# Patient Record
Sex: MALE | Race: BLACK OR AFRICAN AMERICAN | NOT HISPANIC OR LATINO | URBAN - METROPOLITAN AREA
[De-identification: names, ages, dates, MRNs, and addresses within clinical notes are randomized per-mention and may not be internally consistent; named-entity substitution may affect disease eponyms.]

---

## 2023-04-10 LAB
ALBUMIN (G/DL) IN SER/PLAS: 4 G/DL (ref 3.4–5)
ANION GAP IN SER/PLAS: 14 MMOL/L (ref 10–20)
CALCIUM (MG/DL) IN SER/PLAS: 9.1 MG/DL (ref 8.6–10.6)
CARBON DIOXIDE, TOTAL (MMOL/L) IN SER/PLAS: 24 MMOL/L (ref 21–32)
CHLORIDE (MMOL/L) IN SER/PLAS: 107 MMOL/L (ref 98–107)
CHOLESTEROL (MG/DL) IN SER/PLAS: 102 MG/DL (ref 0–199)
CHOLESTEROL IN HDL (MG/DL) IN SER/PLAS: 34.9 MG/DL
CHOLESTEROL/HDL RATIO: 2.9
CREATININE (MG/DL) IN SER/PLAS: 1.08 MG/DL (ref 0.5–1.3)
ESTIMATED AVERAGE GLUCOSE FOR HBA1C: 123 MG/DL
GFR MALE: 82 ML/MIN/1.73M2
GLUCOSE (MG/DL) IN SER/PLAS: 80 MG/DL (ref 74–99)
HEMOGLOBIN A1C/HEMOGLOBIN TOTAL IN BLOOD: 5.9 %
LDL: 33 MG/DL (ref 0–99)
PHOSPHATE (MG/DL) IN SER/PLAS: 2.9 MG/DL (ref 2.5–4.9)
POTASSIUM (MMOL/L) IN SER/PLAS: 4.1 MMOL/L (ref 3.5–5.3)
SODIUM (MMOL/L) IN SER/PLAS: 141 MMOL/L (ref 136–145)
TRIGLYCERIDE (MG/DL) IN SER/PLAS: 169 MG/DL (ref 0–149)
UREA NITROGEN (MG/DL) IN SER/PLAS: 15 MG/DL (ref 6–23)
VLDL: 34 MG/DL (ref 0–40)

## 2023-11-28 ENCOUNTER — OFFICE VISIT (OUTPATIENT)
Dept: PRIMARY CARE | Facility: HOSPITAL | Age: 53
End: 2023-11-28
Payer: COMMERCIAL

## 2023-11-28 VITALS
SYSTOLIC BLOOD PRESSURE: 103 MMHG | BODY MASS INDEX: 31.54 KG/M2 | WEIGHT: 238 LBS | DIASTOLIC BLOOD PRESSURE: 71 MMHG | TEMPERATURE: 98.8 F | HEIGHT: 73 IN | HEART RATE: 59 BPM | OXYGEN SATURATION: 97 %

## 2023-11-28 DIAGNOSIS — Z00.00 HEALTHCARE MAINTENANCE: ICD-10-CM

## 2023-11-28 DIAGNOSIS — I26.99 PULMONARY EMBOLISM, UNSPECIFIED CHRONICITY, UNSPECIFIED PULMONARY EMBOLISM TYPE, UNSPECIFIED WHETHER ACUTE COR PULMONALE PRESENT (MULTI): ICD-10-CM

## 2023-11-28 DIAGNOSIS — E78.00 HIGH CHOLESTEROL: Primary | ICD-10-CM

## 2023-11-28 DIAGNOSIS — Z00.00 HEALTH MAINTENANCE EXAMINATION: ICD-10-CM

## 2023-11-28 DIAGNOSIS — I10 PRIMARY HYPERTENSION: ICD-10-CM

## 2023-11-28 DIAGNOSIS — I50.9 HEART FAILURE, UNSPECIFIED HF CHRONICITY, UNSPECIFIED HEART FAILURE TYPE (MULTI): ICD-10-CM

## 2023-11-28 DIAGNOSIS — E55.9 VITAMIN D DEFICIENCY: ICD-10-CM

## 2023-11-28 PROCEDURE — G0008 ADMIN INFLUENZA VIRUS VAC: HCPCS | Mod: GC | Performed by: RADIOLOGY

## 2023-11-28 PROCEDURE — 1036F TOBACCO NON-USER: CPT | Performed by: RADIOLOGY

## 2023-11-28 PROCEDURE — 99215 OFFICE O/P EST HI 40 MIN: CPT | Performed by: RADIOLOGY

## 2023-11-28 PROCEDURE — 99215 OFFICE O/P EST HI 40 MIN: CPT | Mod: GC | Performed by: RADIOLOGY

## 2023-11-28 PROCEDURE — 3078F DIAST BP <80 MM HG: CPT | Performed by: RADIOLOGY

## 2023-11-28 PROCEDURE — 3074F SYST BP LT 130 MM HG: CPT | Performed by: RADIOLOGY

## 2023-11-28 RX ORDER — CHOLECALCIFEROL (VITAMIN D3) 25 MCG
5000 TABLET ORAL DAILY
Qty: 150 TABLET | Refills: 11 | Status: SHIPPED | OUTPATIENT
Start: 2023-11-28 | End: 2024-02-12 | Stop reason: SDUPTHER

## 2023-11-28 RX ORDER — METOPROLOL SUCCINATE 50 MG/1
100 TABLET, EXTENDED RELEASE ORAL DAILY
Qty: 360 TABLET | Refills: 0 | Status: SHIPPED | OUTPATIENT
Start: 2023-11-28 | End: 2024-02-12 | Stop reason: SDUPTHER

## 2023-11-28 RX ORDER — ATORVASTATIN CALCIUM 40 MG/1
40 TABLET, FILM COATED ORAL DAILY
Qty: 30 TABLET | Refills: 5 | Status: SHIPPED | OUTPATIENT
Start: 2023-11-28 | End: 2024-02-12 | Stop reason: SDUPTHER

## 2023-11-28 RX ORDER — FUROSEMIDE 40 MG/1
40 TABLET ORAL DAILY
Qty: 30 TABLET | Refills: 11 | Status: SHIPPED | OUTPATIENT
Start: 2023-11-28 | End: 2023-11-28 | Stop reason: SDUPTHER

## 2023-11-28 RX ORDER — LISINOPRIL 10 MG/1
10 TABLET ORAL DAILY
Qty: 30 TABLET | Refills: 5 | Status: SHIPPED | OUTPATIENT
Start: 2023-11-28 | End: 2024-02-12 | Stop reason: SDUPTHER

## 2023-11-28 RX ORDER — FUROSEMIDE 40 MG/1
40 TABLET ORAL DAILY PRN
Qty: 30 TABLET | Refills: 11 | Status: SHIPPED | OUTPATIENT
Start: 2023-11-28 | End: 2024-02-12 | Stop reason: SDUPTHER

## 2023-11-28 ASSESSMENT — ENCOUNTER SYMPTOMS
DEPRESSION: 0
LOSS OF SENSATION IN FEET: 0
OCCASIONAL FEELINGS OF UNSTEADINESS: 0

## 2023-11-28 ASSESSMENT — PATIENT HEALTH QUESTIONNAIRE - PHQ9
2. FEELING DOWN, DEPRESSED OR HOPELESS: NOT AT ALL
SUM OF ALL RESPONSES TO PHQ9 QUESTIONS 1 AND 2: 0
1. LITTLE INTEREST OR PLEASURE IN DOING THINGS: NOT AT ALL

## 2023-11-28 ASSESSMENT — PAIN SCALES - GENERAL: PAINLEVEL: 0-NO PAIN

## 2023-11-28 NOTE — PATIENT INSTRUCTIONS
--> NO changes were made to your medications today---    --> Prescriptions/refills were sent to your pharmacy.       --> Please call the  appointment line:  [phone] --- to schedule appointments for ---    Cardiology    Bring cologuard results at next visit    --> Please schedule followup appointment with Sanya Benitez [facility] [phone] in 6 months.    [facility]: phone: [phone], fax: [phone]  [facility] appointment line: [phone] or [phone]

## 2023-11-28 NOTE — PROGRESS NOTES
[facility]     __________________________  ASSESSMENT/PLAN:  __________________________    53 year old male with a PMHx of HTN, pre-diabetes, CKD (baseline Cr 1.0-1.4), cardiac thrombus (2008), HFpEF (recovered HFrEF 35-40%, 50-55% in 2017), BLE DVT & PE (2017), Right knee OA s/p TKA c/b Rt feoral fracture s/p ORIF (July 2022, [facility]) who presents to the [facility].     #HFpEF (previous HFrEF but recovered in 2017)  #Dana-operative VT dana-operational (July 2022)  #HTN  #HLD  -EF improved on TTE from 11/29/17 to 50-55%  -Continue lisinopril 10mg daily, metoprolol succinate 100mg daily and furosemide 40mg PRN  -Continue atorvastatin 40mg daily  -Cardiology referral made    #Right knee OA s/p TKA c/b Rt femoral fracture s/p ORIF (July 2022, [facility])  -[facility] ortho following, plan for potential L TKA in spring 2024    #DVT/PE (4/2017)  #History of cardiac thrombus (2008)  -on rivaroxaban 20mg daily     #CKD:  -Baseline CR 1.0-1.4     #Prediabetes (A1c of 5.9 4/2024  -off meformin     #Health maintenance  -colonoscopy: ordered, but pt said he did cologuard few wks ago through insurance, asked to bring results next time  -HIV (2017): negative  -Hep B/C (2017): negative  -TSH (2018): 0.99  -Lipid panel (2024)  -Pneumovax: 2018, next after 64  -Shingles: 12/2019 and 2/2020  -COVID x3  -flu 2024    RETURN TO CLINIC:  6 MONTHS    __________________________  HPI:  __________________________    53 year old male with a PMHx of HTN, pre-diabetes, CKD (baseline Cr 1.0-1.4), cardiac thrombus (2008), HFpEF (recovered HFrEF 35-40%, 50-55% in 2017), BLE DVT & PE (2017), Right knee OA s/p TKA c/b Rt feoral fracture s/p ORIF (July 2022, [facility]) who [facility].    Feeling well, no issues, here for checkup/refills    Recently had R knee/femur surgery done by ortho [facility], has been in rehab    ROS:   Denied fever/chills, headache, vision changes, lightheadedness/dizziness, chest pain, SOB, cough,  "abdominal pain, n/v, constipation/diarrhea, dysuria, weakness.       MEDS:    Current Outpatient Medications:     atorvastatin (Lipitor) 40 mg tablet, Take 1 tablet (40 mg) by mouth once daily., Disp: 30 tablet, Rfl: 5    cholecalciferol (Vitamin D-3) 25 MCG (1000 UT) tablet, Take 5 tablets (5,000 Units) by mouth once daily., Disp: 150 tablet, Rfl: 11    furosemide (Lasix) 40 mg tablet, Take 1 tablet (40 mg) by mouth once daily as needed (take if weight gain greater than 3 lbs in 3 days)., Disp: 30 tablet, Rfl: 11    lisinopril 10 mg tablet, Take 1 tablet (10 mg) by mouth once daily., Disp: 30 tablet, Rfl: 5    metoprolol succinate XL (Toprol-XL) 50 mg 24 hr tablet, Take 2 tablets (100 mg) by mouth once daily. Do not crush or chew., Disp: 360 tablet, Rfl: 0    rivaroxaban (Xarelto) 20 mg tablet, Take 1 tablet (20 mg) by mouth once daily. Take with food., Disp: 30 tablet, Rfl: 11     __________________________  OBJECTIVE:  __________________________    /71 (BP Location: Right arm, Patient Position: Sitting, BP Cuff Size: Large adult)   Pulse 59   Temp 37.1 °C (98.8 °F) (Temporal)   Ht 1.854 m (6' 1\")   Wt 108 kg (238 lb)   SpO2 97%   BMI 31.40 kg/m²      Constitutional - In no acute distress, well appearing and well nourished.   Head- Normocephalic, atraumatic.   Moist mucous membrane.    Eye: EOMI. No scleral icterus.  No conjunctival injection.  No drainage noted.  Neck - Trachea midline. No neck masses observed.   Pulmonary - Normal respiration. Clear to auscultation bilaterally.   Cardiovascular - Normal rate and rhythm, no murmurs/gallop/rubs.   Ext - trace LE peripheral edema. B/l venous stasis  Abdomen- No TTP,  no guarding, no rebound, bowel sounds positive in all 4 quadrants.    Neuro - Alert. Answering questions appropriately. No gross neurological deficits. Spontaneously moving all extremities.  Psychiatric -  Normal mood and affect.      Varinder Villela MD  PGY-3 Internal Medicine  [facility] [facility]

## 2023-11-28 NOTE — PROGRESS NOTES
I saw and evaluated the patient. I personally obtained the key and critical portions of the history and physical exam or was physically present for key and critical portions performed by the resident/fellow. I reviewed the resident/fellow's documentation and discussed the patient with the resident/fellow. I agree with the resident/fellow's medical decision making as documented in the note.    Aviva Walker MD

## 2023-11-30 ENCOUNTER — APPOINTMENT (OUTPATIENT)
Dept: DERMATOLOGY | Facility: CLINIC | Age: 53
End: 2023-11-30
Payer: COMMERCIAL

## 2024-02-12 ENCOUNTER — TELEPHONE (OUTPATIENT)
Dept: PRIMARY CARE | Facility: HOSPITAL | Age: 54
End: 2024-02-12
Payer: COMMERCIAL

## 2024-02-12 DIAGNOSIS — I50.9 HEART FAILURE, UNSPECIFIED HF CHRONICITY, UNSPECIFIED HEART FAILURE TYPE (MULTI): ICD-10-CM

## 2024-02-12 DIAGNOSIS — I10 PRIMARY HYPERTENSION: ICD-10-CM

## 2024-02-12 DIAGNOSIS — I26.99 PULMONARY EMBOLISM, UNSPECIFIED CHRONICITY, UNSPECIFIED PULMONARY EMBOLISM TYPE, UNSPECIFIED WHETHER ACUTE COR PULMONALE PRESENT (MULTI): ICD-10-CM

## 2024-02-12 DIAGNOSIS — E55.9 VITAMIN D DEFICIENCY: ICD-10-CM

## 2024-02-12 DIAGNOSIS — E78.00 HIGH CHOLESTEROL: ICD-10-CM

## 2024-02-12 RX ORDER — ATORVASTATIN CALCIUM 40 MG/1
40 TABLET, FILM COATED ORAL DAILY
Qty: 30 TABLET | Refills: 5 | Status: SHIPPED | OUTPATIENT
Start: 2024-02-12 | End: 2024-08-10

## 2024-02-12 RX ORDER — LISINOPRIL 10 MG/1
10 TABLET ORAL DAILY
Qty: 30 TABLET | Refills: 5 | Status: SHIPPED | OUTPATIENT
Start: 2024-02-12 | End: 2024-08-10

## 2024-02-12 RX ORDER — FUROSEMIDE 40 MG/1
40 TABLET ORAL DAILY PRN
Qty: 30 TABLET | Refills: 11 | Status: SHIPPED | OUTPATIENT
Start: 2024-02-12 | End: 2025-02-11

## 2024-02-12 RX ORDER — METOPROLOL SUCCINATE 50 MG/1
100 TABLET, EXTENDED RELEASE ORAL DAILY
Qty: 360 TABLET | Refills: 0 | Status: SHIPPED | OUTPATIENT
Start: 2024-02-12 | End: 2024-08-10

## 2024-02-12 RX ORDER — CHOLECALCIFEROL (VITAMIN D3) 25 MCG
5000 TABLET ORAL DAILY
Qty: 150 TABLET | Refills: 11 | Status: SHIPPED | OUTPATIENT
Start: 2024-02-12 | End: 2024-02-12 | Stop reason: SDUPTHER

## 2024-02-12 NOTE — TELEPHONE ENCOUNTER
Rx Refill Request Telephone Encounter    Name:  Jac Pederson  :  [id number]    Prescription sent to [facility] pharmacy for atorvastatin, Lasix, lisinopril, metoprolol succinate, rivaroxaban.  Patient called and informed of the above.  Reports that he does not need vitamin D refilled as he is already taking over-the-counter.

## 2024-02-14 ENCOUNTER — OFFICE VISIT (OUTPATIENT)
Dept: DERMATOLOGY | Facility: CLINIC | Age: 54
End: 2024-02-14
Payer: COMMERCIAL

## 2024-02-14 ENCOUNTER — APPOINTMENT (OUTPATIENT)
Dept: CARDIOLOGY | Facility: HOSPITAL | Age: 54
End: 2024-02-14
Payer: COMMERCIAL

## 2024-02-14 DIAGNOSIS — I87.8 VENOUS STASIS OF BOTH LOWER EXTREMITIES: Primary | ICD-10-CM

## 2024-02-14 PROCEDURE — 99203 OFFICE O/P NEW LOW 30 MIN: CPT | Performed by: STUDENT IN AN ORGANIZED HEALTH CARE EDUCATION/TRAINING PROGRAM

## 2024-02-14 PROCEDURE — 1036F TOBACCO NON-USER: CPT | Performed by: STUDENT IN AN ORGANIZED HEALTH CARE EDUCATION/TRAINING PROGRAM

## 2024-02-14 NOTE — PROGRESS NOTES
Subjective     Jac Pederson is a 53 y.o. male who presents for the following: hyperpigmentation (Bilateral legs).     Review of Systems:  No other skin or systemic complaints other than what is documented elsewhere in the note.    The following portions of the chart were reviewed this encounter and updated as appropriate:          Skin Cancer History  No skin cancer on file.      Specialty Problems    None       Objective   Well appearing patient in no apparent distress; mood and affect are within normal limits.    A focused skin examination was performed. All findings within normal limits unless otherwise noted below.    Assessment/Plan   1. Venous stasis of both lower extremities (2)  Left Lower Leg - Anterior, Right Lower Leg - Anterior  Hyperpigmented macules and patches involving bilateral lower extremities and areas of atrophic hypopigmentation most consistent with scars. 2+ pitting edema. On the left lateral ankle is a roughly 2 cm ulcer with granulation tissue    - Venous stasis changes of bilateral lower extremities, no active stasis dermatitis  - Recommend compression stockings daily, patient has these at home  - Patient reports he scratched himself where the wound is on the left ankle 1 week ago but advised to return if not healed in the next month or so        Angelique Johnson MD    I was present during all portions of visit and supervised resident directly   Including any discussion and performing of procedures as well as medical management and follow up care

## 2024-02-19 ENCOUNTER — APPOINTMENT (OUTPATIENT)
Dept: CARDIOLOGY | Facility: HOSPITAL | Age: 54
End: 2024-02-19
Payer: COMMERCIAL

## 2024-06-24 DIAGNOSIS — I50.9 HEART FAILURE, UNSPECIFIED HF CHRONICITY, UNSPECIFIED HEART FAILURE TYPE (MULTI): ICD-10-CM

## 2024-06-26 RX ORDER — METOPROLOL SUCCINATE 50 MG/1
TABLET, EXTENDED RELEASE ORAL
Qty: 360 TABLET | Refills: 2 | Status: SHIPPED | OUTPATIENT
Start: 2024-06-26

## 2024-09-25 ENCOUNTER — TELEPHONE (OUTPATIENT)
Dept: HOME HEALTH SERVICES | Facility: HOME HEALTH | Age: 54
End: 2024-09-25

## 2024-09-25 ENCOUNTER — DOCUMENTATION (OUTPATIENT)
Dept: PRIMARY CARE | Facility: HOSPITAL | Age: 54
End: 2024-09-25
Payer: COMMERCIAL

## 2024-09-25 ENCOUNTER — OFFICE VISIT (OUTPATIENT)
Dept: PRIMARY CARE | Facility: HOSPITAL | Age: 54
End: 2024-09-25
Payer: COMMERCIAL

## 2024-09-25 VITALS
BODY MASS INDEX: 34.99 KG/M2 | HEIGHT: 73 IN | DIASTOLIC BLOOD PRESSURE: 80 MMHG | WEIGHT: 264 LBS | HEART RATE: 72 BPM | OXYGEN SATURATION: 97 % | TEMPERATURE: 97.6 F | SYSTOLIC BLOOD PRESSURE: 131 MMHG

## 2024-09-25 DIAGNOSIS — I10 PRIMARY HYPERTENSION: ICD-10-CM

## 2024-09-25 DIAGNOSIS — Z12.11 COLON CANCER SCREENING: ICD-10-CM

## 2024-09-25 DIAGNOSIS — I26.99 PULMONARY EMBOLISM, UNSPECIFIED CHRONICITY, UNSPECIFIED PULMONARY EMBOLISM TYPE, UNSPECIFIED WHETHER ACUTE COR PULMONALE PRESENT (MULTI): ICD-10-CM

## 2024-09-25 DIAGNOSIS — T14.8XXD WOUND HEALING, DELAYED: Primary | ICD-10-CM

## 2024-09-25 DIAGNOSIS — E78.00 HIGH CHOLESTEROL: ICD-10-CM

## 2024-09-25 DIAGNOSIS — I50.9 HEART FAILURE, UNSPECIFIED HF CHRONICITY, UNSPECIFIED HEART FAILURE TYPE: ICD-10-CM

## 2024-09-25 LAB
ALBUMIN SERPL BCP-MCNC: 4.1 G/DL (ref 3.4–5)
ALP SERPL-CCNC: 112 U/L (ref 33–120)
ALT SERPL W P-5'-P-CCNC: 17 U/L (ref 10–52)
ANION GAP SERPL CALC-SCNC: 14 MMOL/L (ref 10–20)
AST SERPL W P-5'-P-CCNC: 19 U/L (ref 9–39)
BILIRUB SERPL-MCNC: 0.7 MG/DL (ref 0–1.2)
BUN SERPL-MCNC: 16 MG/DL (ref 6–23)
CALCIUM SERPL-MCNC: 9.7 MG/DL (ref 8.6–10.6)
CHLORIDE SERPL-SCNC: 102 MMOL/L (ref 98–107)
CHOLEST SERPL-MCNC: 128 MG/DL (ref 0–199)
CHOLESTEROL/HDL RATIO: 2.9
CO2 SERPL-SCNC: 25 MMOL/L (ref 21–32)
CREAT SERPL-MCNC: 1.12 MG/DL (ref 0.5–1.3)
EGFRCR SERPLBLD CKD-EPI 2021: 78 ML/MIN/1.73M*2
ERYTHROCYTE [DISTWIDTH] IN BLOOD BY AUTOMATED COUNT: 14.6 % (ref 11.5–14.5)
GLUCOSE SERPL-MCNC: 89 MG/DL (ref 74–99)
HCT VFR BLD AUTO: 45.9 % (ref 41–52)
HDLC SERPL-MCNC: 44.4 MG/DL
HGB BLD-MCNC: 15.6 G/DL (ref 13.5–17.5)
LDLC SERPL CALC-MCNC: 66 MG/DL
MAGNESIUM SERPL-MCNC: 1.92 MG/DL (ref 1.6–2.4)
MCH RBC QN AUTO: 31.4 PG (ref 26–34)
MCHC RBC AUTO-ENTMCNC: 34 G/DL (ref 32–36)
MCV RBC AUTO: 92 FL (ref 80–100)
NON HDL CHOLESTEROL: 84 MG/DL (ref 0–149)
NRBC BLD-RTO: 0 /100 WBCS (ref 0–0)
PHOSPHATE SERPL-MCNC: 3.2 MG/DL (ref 2.5–4.9)
PLATELET # BLD AUTO: 311 X10*3/UL (ref 150–450)
POTASSIUM SERPL-SCNC: 4 MMOL/L (ref 3.5–5.3)
PROT SERPL-MCNC: 7.8 G/DL (ref 6.4–8.2)
RBC # BLD AUTO: 4.97 X10*6/UL (ref 4.5–5.9)
SODIUM SERPL-SCNC: 137 MMOL/L (ref 136–145)
TRIGL SERPL-MCNC: 86 MG/DL (ref 0–149)
VLDL: 17 MG/DL (ref 0–40)
WBC # BLD AUTO: 6.7 X10*3/UL (ref 4.4–11.3)

## 2024-09-25 PROCEDURE — 99214 OFFICE O/P EST MOD 30 MIN: CPT

## 2024-09-25 PROCEDURE — 3008F BODY MASS INDEX DOCD: CPT

## 2024-09-25 PROCEDURE — 80061 LIPID PANEL: CPT

## 2024-09-25 PROCEDURE — 99214 OFFICE O/P EST MOD 30 MIN: CPT | Mod: GC

## 2024-09-25 PROCEDURE — 36415 COLL VENOUS BLD VENIPUNCTURE: CPT

## 2024-09-25 PROCEDURE — 3075F SYST BP GE 130 - 139MM HG: CPT

## 2024-09-25 PROCEDURE — 84100 ASSAY OF PHOSPHORUS: CPT

## 2024-09-25 PROCEDURE — 93010 ELECTROCARDIOGRAM REPORT: CPT | Performed by: INTERNAL MEDICINE

## 2024-09-25 PROCEDURE — 93005 ELECTROCARDIOGRAM TRACING: CPT

## 2024-09-25 PROCEDURE — 90677 PCV20 VACCINE IM: CPT | Mod: GC

## 2024-09-25 PROCEDURE — G0008 ADMIN INFLUENZA VIRUS VAC: HCPCS | Mod: GC

## 2024-09-25 PROCEDURE — 1036F TOBACCO NON-USER: CPT

## 2024-09-25 PROCEDURE — 85027 COMPLETE CBC AUTOMATED: CPT

## 2024-09-25 PROCEDURE — 80053 COMPREHEN METABOLIC PANEL: CPT

## 2024-09-25 PROCEDURE — 83735 ASSAY OF MAGNESIUM: CPT

## 2024-09-25 PROCEDURE — 3079F DIAST BP 80-89 MM HG: CPT

## 2024-09-25 RX ORDER — LISINOPRIL 10 MG/1
10 TABLET ORAL DAILY
Qty: 30 TABLET | Refills: 5 | Status: SHIPPED | OUTPATIENT
Start: 2024-09-25 | End: 2025-03-24

## 2024-09-25 RX ORDER — FUROSEMIDE 40 MG/1
40 TABLET ORAL DAILY PRN
Qty: 90 EACH | Refills: 2 | Status: SHIPPED | OUTPATIENT
Start: 2024-09-25 | End: 2025-09-25

## 2024-09-25 RX ORDER — METOPROLOL SUCCINATE 50 MG/1
50 TABLET, EXTENDED RELEASE ORAL DAILY
Qty: 90 TABLET | Refills: 2 | Status: SHIPPED | OUTPATIENT
Start: 2024-09-25 | End: 2025-06-22

## 2024-09-25 RX ORDER — ATORVASTATIN CALCIUM 40 MG/1
40 TABLET, FILM COATED ORAL DAILY
Qty: 30 TABLET | Refills: 5 | Status: SHIPPED | OUTPATIENT
Start: 2024-09-25 | End: 2025-03-24

## 2024-09-25 ASSESSMENT — PATIENT HEALTH QUESTIONNAIRE - PHQ9
SUM OF ALL RESPONSES TO PHQ9 QUESTIONS 1 AND 2: 0
2. FEELING DOWN, DEPRESSED OR HOPELESS: NOT AT ALL
1. LITTLE INTEREST OR PLEASURE IN DOING THINGS: NOT AT ALL

## 2024-09-25 ASSESSMENT — PAIN SCALES - GENERAL: PAINLEVEL: 0-NO PAIN

## 2024-09-25 ASSESSMENT — ENCOUNTER SYMPTOMS
OCCASIONAL FEELINGS OF UNSTEADINESS: 0
DEPRESSION: 0
LOSS OF SENSATION IN FEET: 0

## 2024-09-25 NOTE — PROGRESS NOTES
Chief complaint:  No new complaints     HPI:  Jac Pederson is a 54 y.o. male w/ PMH of HFrEF (prev HFrEF, now recovered, TTE 11/2022 EF 50-55%), DVT (B/l LE 2017, on Xarelto), HTN, HLD, Pre DM (last A1C 5/7 6.0), B/l Chronic Stasis Dermatitis, Bl TKA (last was L in 5/2024, prior R c/b femur fx requiring ORIF) and healing LLE wound to lateral ankle who presents for f/up care and request for renewal for Home Care for Left lower extremity wound care. Pt reports that his health has been generally well and did not have any new issues to report. He was unable to go for his Cardiology referral from 11/2023 appointment. A new referral was made and Mr. Pederson said he would follow-up with this appointment. He was noted to have gained weight of ~30Ibs from 11/2023 PCP visit but he denied any extra pillows when sleeping and no HITCHCOCK. He stated that he did not get his Cologuard in the mail and would like to have Colonoscopy ordered and will be following with the appointment. Condirmed that his L TKA in 05/2024 was successful and no other issues there. His L still had some pain that he takes Aleve for. He was cautioned to use moderately and to use Tylenol as adjunct given being on Xarelto. He denied any other complaints of fever, chills, headache, N/V, chest pain, shortness of breath, abdominal pain, constipation/diarrhea, dysuria.     Pt was noted to have irregular heart beat on Physical Exam and an EKG showed he was likely in Afib rhythm.   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 .    Review of systems:  As stated in HPI    Medications:  Current Outpatient Medications   Medication Instructions    atorvastatin (LIPITOR) 40 mg, oral, Daily    furosemide (LASIX) 40 mg, oral, Daily PRN    lisinopril 10 mg, oral, Daily    metoprolol succinate XL (Toprol-XL) 50 mg 24 hr tablet TAKE 2 TABLETS ONCE DAILY (DO NOT CRUSH OR CHEW)    rivaroxaban (XARELTO) 20 mg, oral, Daily,  "Take with food.     Allergies:  Allergies   Allergen Reactions    Orange Hives and Itching     Past medical history:  Past Medical History:   Diagnosis Date    Personal history of other specified conditions 05/07/2018    History of insomnia    Rash and other nonspecific skin eruption 07/12/2017    Rash     Surgical history:  L and R TKA     Family history:  Father - Prostate CA    Social history:   reports that he has never smoked. He has never used smokeless tobacco. He reports that he does not drink alcohol and does not use drugs.    Health maintenance:  Health Maintenance   Topic Date Due    Medicare Annual Wellness Visit (AWV)  Never done    Colorectal Cancer Screening  Never done    MMR Vaccines (1 of 1 - Standard series) Never done    Hepatitis B Vaccines (1 of 3 - 19+ 3-dose series) Never done    Pneumococcal Vaccine: Pediatrics (0 to 5 Years) and At-Risk Patients (6 to 64 Years) (2 of 2 - PCV) 01/17/2019    Echocardiogram  07/11/2023    Creatinine Level  04/10/2024    Potassium Level  04/10/2024    Influenza Vaccine (1) 09/01/2024    COVID-19 Vaccine (5 - 2023-24 season) 09/01/2024    Diabetes: Hemoglobin A1C  05/07/2025    Diabetes Screening  05/07/2025    DTaP/Tdap/Td Vaccines (2 - Td or Tdap) 07/12/2027    Lipid Panel  04/10/2028    Zoster Vaccines  Completed    HIV Screening  Completed    Hepatitis C Screening  Completed    HIB Vaccines  Aged Out    IPV Vaccines  Aged Out    Hepatitis A Vaccines  Aged Out    Meningococcal Vaccine  Aged Out    Rotavirus Vaccines  Aged Out    HPV Vaccines  Aged Out     Vitals:  /80   Pulse 72   Temp 36.4 °C (97.6 °F) (Temporal)   Ht 1.854 m (6' 1\")   Wt 120 kg (264 lb)   SpO2 97%   BMI 34.83 kg/m²     Physical Exam:  General: Awake, alert, conversant, appears stated age, Obese  HEENT: Pupils equal and round, no scleral icterus or conjunctivitis  Skin: Healing wound to L outer ankle. B/l Stasis Dermatitis present.   Chest: Ctab, normal respiratory effort, not on " "supplemental oxygen  Cardiac: Irregular rhythm but regular rate, normal s1, s2, no M/R/G, no JVD  Abdomen: Soft, ND, NT, no involuntary guarding  : No flank pain or indwelling urinary catheter  EXT: No peripheral edema, no asymmetry noted  MSK: No focal joint swelling noted  Neuro: AOx4, moving all limbs spontaneously, follows commands  Psych: Coherent thought process, appropriate mood and affect    Labs:  Lab Results   Component Value Date    WBC 6.0 11/05/2018    HGB 14.2 11/05/2018    HCT 42.7 11/05/2018    MCV 96 11/05/2018     11/05/2018     Lab Results   Component Value Date    GLUCOSE 80 04/10/2023    CALCIUM 9.1 04/10/2023     04/10/2023    K 4.1 04/10/2023    CO2 24 04/10/2023     04/10/2023    BUN 15 04/10/2023    CREATININE 1.08 04/10/2023     Lab Results   Component Value Date    HGBA1C 6.0 (H) 05/07/2024      Lab Results   Component Value Date    CHOL 102 04/10/2023    CHOL 113 07/30/2020    CHOL 133 11/05/2018     Lab Results   Component Value Date    HDL 34.9 (A) 04/10/2023    HDL 34.0 (A) 07/30/2020    HDL 44.5 11/05/2018     No results found for: \"LDLCALC\"  Lab Results   Component Value Date    TRIG 169 (H) 04/10/2023    TRIG 174 (H) 07/30/2020    TRIG 209 (H) 11/05/2018         Assessment and plan:  Mr. Jac Pederson is a 55 y/o M w/ PMH of HF (prev HFrEF, now recovered, TTE 11/2022 EF 50-55%), DVT (B/l LE 2017, on Xarelto), HTN, HLD, Pre DM (last A1C 5/7 6.0), B/l Chronic Stasis Dermatitis, Bl TKA (last was L in 5/2024, prior R c/b femur fx requiring ORIf) and healing LLE wound to lateral ankle who presents for f/up care and request for renewal for Home Care for Left lower extremity wound care. Pt was seen to have irregular HR and EKG showed likely Afib. Pt is asymptomatic and no evidence of decompensated HF. Pt plans to follow referral to Cardiology. Pt also received Flu and Pneumovax Vaccine on this encounter.      Encounter summary:  - To continue to take your medications " as indicated   - Continue wound care as instructed and will have Home Health assist with this - referral made   - Cardiology referral made for Heart Failure diagnosis and evidence of irregular heart rhythm   - Pt reported taking Metop Succ 50mg daily instead of 100mg. Script renewed for 50mg as HR was ~72. Will follow Cards recs.   - Colonoscopy - ordered   - Lab work collected and will be followed up by the [facility] and you will be informed of the results - CBC, CMP, Mg, Phos, Lipid Panel   - Vaccinations for Influenza and Pneumococcal Vaccine     #HFpEF (previous HFrEF but recovered in 2017)  #[place]-operative VT modesto-operational (July 2022)  #HTN  #HLD  #Afib?  :: EF improved on TTE from 6/2022 to 50-55%  :: EKG 9/25 showing Afib rhythm; not symptomatic    - C/w Lisinopril 10mg daily  - C/w Metoprolol Succinate 50mg daily  - C/w Furosemide 40mg PRN for 3Ibs weight gain over 3 days   - C/w Atorvastatin 40mg daily  - Already on Xarelto for hx of DVT   - Cardiology referral made  - F/up on CMP, Mg, CBC, Lipid Panel     #Right knee OA   #LLE wound   :: S/p TKA, c/b Rt femoral fracture s/p ORIF - July 2022, [facility]  :: S/p TKA L TKA 5/2024  :: LLE wound from 4/2024 after accidental laceration trauma   - Follows w/ [facility] Ortho for Knees  - Home Health care to help w/ LLE wound dressing - referral made and paperwork faxed 9/25     #DVT/PE (4/2017)  #History of cardiac thrombus (2008)  - C/w Rivaroxaban 20mg daily     #CKD  :: Baseline CR 1.0-1.4  - F/up CMP, Mg, Phos     #Pre-Diabetes (A1c 6.0 5/2024)  :: Previously managed on Metformin  :: Currently diet controlled  - C/w w/ current diet plan     #Health maintenance  - Colonoscopy ordered   - HIV (2017): negative  - Hep B/C (2017): negative  -TSH (2018): 0.99  - Lipid panel (2024)  - Pneumovax 23 2018, PREVNAR 20 9/2024 - next after 64  - Shingles: 12/2019 and 2/2020  - COVID x3  - Flu 2024    Items for Next Visit:  Follow-up Labs, Cards, LLE wound    Patient and plan discussed  with attending physician Dr. Sampson.    Clement Seth MD  PGY2, Internal Medicine

## 2024-09-25 NOTE — PROGRESS NOTES
"Patient ID: Jac Pederson is a 54 y.o. male.    ProceduresSubjective   Patient ID: Jac Pederson is a 54 y.o. male who presents for Follow-up (F2F HOME CARE WOUND CARE, MEDICATION REFILLS).    HPI     Review of Systems    Objective   /80   Pulse 72   Temp 36.4 °C (97.6 °F) (Temporal)   Ht 1.854 m (6' 1\")   Wt 120 kg (264 lb)   SpO2 97%   BMI 34.83 kg/m²     Physical Exam    Assessment/Plan   {Assess/PlanSmartLinks:47506}       "

## 2024-09-25 NOTE — TELEPHONE ENCOUNTER
Good afternoon,  [facility] requires specific wound care instructions for the patient. I.E. what type of dressings to use; what kind of topicals, cleansers, etc.; how does the MD want home care to treat the wounds and how often. Also, please provide the name of a teachable CG or who will assistance in the home for wound care?  Also, Pt must be HOMEBOUND if services will be covered by CMS. Please amend orders and resubmit if patient is indeed homebound. If not, the patient will need to go outpatient for services. Thank you.

## 2024-09-25 NOTE — PROGRESS NOTES
Advanced care planning discussed at this visit. Patient has a Healthcare Power of  but it is currently not on file. He expressed that his sister Davin Angulo has his permission to act as his surrogate decision maker in the event of an emergency. In addition, the patient added his daughter, Donna Angulo, as his first alternate surrogate decision maker. Patient advised to bring in POA, Living Will and Advanced Care Plan for his chart at next visit.

## 2024-09-25 NOTE — PATIENT INSTRUCTIONS
As discussed today, our plan is:   - To continue to take your medications as indicated   - Continue wound care as instructed and will have Home Health assist with this - referral made   - Cardiology referral made for Heart Failure diagnosis and evidence of irregular heart rhythm   - Colonoscopy - ordered   - Lab work collected and will be followed up by the [facility] and you will be informed of the results  - Vaccinations for Influenza and Pneumococcal Vaccine     Please note the following below as well:     Labs - we collected labs today and will call you with any abnormal results. If you have any questions or concerns prior to us calling feel free to call our office to have your questions addressed.   Medication changes: None   Consultations - you were referred to see the following specialists: Cardiology - You should receive a call from central scheduling in the next few days if you do not receive a call within 3-5 business days please call [phone] to schedule your appointment.   4.   If you smoke or use other tobacco products, take steps to quit. Call [phone] for more information or to set up an appointment with  [facility]. The [place] Tobacco Quit Line is a free resource for people who don’t have insurance, receive Medicaid, pregnant women, or members of the [place] [employer] [facility]. Call 5-261-QUIT-NOW or 1-283.668.1222.    Please come back to see us in: 6 months  ------  If you have any problems or questions, please contact the clinic at [phone] to leave a message. Our fax number is [phone]. If your issue cannot wait until the next business day, please go to urgent care or the emergency department.     I also strongly urge all of my patients to register for [facility] by going to: [email or web address]  (The  staff can also send you a text/email link to register when you check out).    No shows: It is understandable if you are unable to make  it to a visit, but please cancel your appointment instead of not showing up. This helps to give other patients access to primary care and keeps wait times low.      [facility]   [phone]

## 2024-09-26 NOTE — PROGRESS NOTES
I saw and evaluated the patient. I personally obtained the key and critical portions of the history and physical exam or was physically present for key and critical portions performed by the resident/fellow. I reviewed the resident/fellow's documentation and discussed the patient with the resident/fellow. I agree with the resident/fellow's medical decision making as documented in the note.    Salima Sampson MD MPH

## 2024-09-30 ENCOUNTER — DOCUMENTATION (OUTPATIENT)
Dept: HOME HEALTH SERVICES | Facility: HOME HEALTH | Age: 54
End: 2024-09-30
Payer: COMMERCIAL

## 2024-09-30 NOTE — HH CARE COORDINATION
This referral has been made a Non Admit with  Home Care due to  Homebound status marked NO . If you have further questions, feel free to reach out to our office at [phone]. Thank you, [facility] Intake.

## 2024-10-01 NOTE — TELEPHONE ENCOUNTER
Myron Sampson,    So I just spoke to his previous care coordinator at [facility] before it was d/sriram and she states that a Resident cannot confirm the face-to-face encounter before the service is resumed. She said that you can provide a verbal confirmation briefly. The contact details are as follows: Number is [phone]. Press extension 1 after it goes through. Tell the  that you want to talk to Marce. Once you confirm the go-ahead to resume this, they'll resume the service. They have his wound care regimen already and will follow with this. Kindly let me know if you have any problems.

## 2024-10-04 ENCOUNTER — TELEPHONE (OUTPATIENT)
Dept: PRIMARY CARE | Facility: HOSPITAL | Age: 54
End: 2024-10-04
Payer: COMMERCIAL

## 2024-10-04 ENCOUNTER — OFFICE VISIT (OUTPATIENT)
Dept: URGENT CARE | Age: 54
End: 2024-10-04
Payer: COMMERCIAL

## 2024-10-04 VITALS
OXYGEN SATURATION: 95 % | HEART RATE: 77 BPM | WEIGHT: 264 LBS | RESPIRATION RATE: 15 BRPM | HEIGHT: 72 IN | SYSTOLIC BLOOD PRESSURE: 110 MMHG | TEMPERATURE: 97.6 F | DIASTOLIC BLOOD PRESSURE: 76 MMHG | BODY MASS INDEX: 35.76 KG/M2

## 2024-10-04 DIAGNOSIS — L03.116 CELLULITIS OF LEFT LEG WITHOUT FOOT: ICD-10-CM

## 2024-10-04 DIAGNOSIS — T14.8XXD DELAYED WOUND HEALING: Primary | ICD-10-CM

## 2024-10-04 PROCEDURE — 87077 CULTURE AEROBIC IDENTIFY: CPT

## 2024-10-04 RX ORDER — CEPHALEXIN 500 MG/1
500 CAPSULE ORAL 2 TIMES DAILY
Qty: 20 CAPSULE | Refills: 0 | Status: SHIPPED | OUTPATIENT
Start: 2024-10-04 | End: 2024-10-14

## 2024-10-04 ASSESSMENT — ENCOUNTER SYMPTOMS
GASTROINTESTINAL NEGATIVE: 1
RESPIRATORY NEGATIVE: 1
CONSTITUTIONAL NEGATIVE: 1
WOUND: 1
CARDIOVASCULAR NEGATIVE: 1

## 2024-10-04 NOTE — TELEPHONE ENCOUNTER
Patient's [facility] called states they resumed services. Nurse states the patient has an infected wound on lower left leg. She observes pus and drainage on/around wound. She's requesting to start antibiotics.     Patent's referred pharmacy for meds : [facility] on [address].

## 2024-10-04 NOTE — PROGRESS NOTES
Subjective   Patient ID: Jac Pederson is a 54 y.o. male. They present today with a chief complaint of Injury (Lt anklewound possible infection).    History of Present Illness  Wound Check: Patient presents for wound check. Patient has an open wound which is located on the left lateral leg. Current symptoms: drainage: serosanguinous, purulent. Symptoms began several days ago. Pain is rated 3/10. Interventions to date: dressing changed 2 times per week. Denies fever, chills. States he has not been elevating leg as often as he was told to by home health care.          History provided by:  Patient      Past Medical History  Allergies as of 10/04/2024 - Reviewed 09/25/2024   Allergen Reaction Noted    Orange Hives and Itching 06/12/2015       (Not in a hospital admission)       Past Medical History:   Diagnosis Date    Personal history of other specified conditions 05/07/2018    History of insomnia    Rash and other nonspecific skin eruption 07/12/2017    Rash       No past surgical history on file.     reports that he has never smoked. He has never used smokeless tobacco. He reports that he does not drink alcohol and does not use drugs.    Review of Systems  Review of Systems   Constitutional: Negative.    Respiratory: Negative.     Cardiovascular: Negative.    Gastrointestinal: Negative.    Skin:  Positive for wound.   All other systems reviewed and are negative.                                 Objective    Vitals:    10/04/24 1520   BP: 110/76   BP Location: Right arm   Patient Position: Sitting   BP Cuff Size: Adult   Pulse: 77   Resp: 15   Temp: 36.4 °C (97.6 °F)   TempSrc: Oral   SpO2: 95%   Weight: 120 kg (264 lb)   Height: 1.829 m (6')     No LMP for male patient.    Physical Exam  Vitals and nursing note reviewed.   Constitutional:       Appearance: Normal appearance.   Cardiovascular:      Rate and Rhythm: Normal rate and regular rhythm.      Pulses: Normal pulses.           Dorsalis pedis pulses are 2+ on  the right side and 2+ on the left side.        Posterior tibial pulses are 2+ on the right side and 2+ on the left side.      Heart sounds: Normal heart sounds.   Pulmonary:      Effort: Pulmonary effort is normal.      Breath sounds: Normal breath sounds.   Musculoskeletal:      Right upper leg: Normal. No tenderness or bony tenderness.      Left upper leg: Normal. No tenderness or bony tenderness.      Right knee: Normal. No swelling or deformity.      Left knee: Normal. No swelling or deformity.      Right lower leg: Normal. No swelling, tenderness or bony tenderness. No edema.      Left lower leg: Normal. No swelling, tenderness or bony tenderness. No edema.      Right ankle: Normal.      Left ankle: Normal.      Right foot: Normal capillary refill. No tenderness.      Left foot: Normal capillary refill. No tenderness.   Feet:      Right foot:      Skin integrity: No erythema or warmth.      Left foot:      Skin integrity: No erythema or warmth.   Skin:     General: Skin is warm and dry.      Capillary Refill: Capillary refill takes less than 2 seconds.      Findings: Wound present.             Comments: Open wound to L lateral shin/calf. Wound romy ~ 2.5w x 3.2H x 0.2D. Wound bed beefy red with approx 70% granulation tissue. Crusting edges with irregular borders. Small amount serosanguinous drainage. Eschar along posterior border. Wound culture obtained.    Neurological:      Mental Status: He is alert and oriented to person, place, and time.   Psychiatric:         Mood and Affect: Mood normal.         Behavior: Behavior normal.         Thought Content: Thought content normal.         Judgment: Judgment normal.         Wound Care    Date/Time: 10/4/2024 3:50 PM    Performed by: MILE Beck  Authorized by: MILE Beck    Consent:     Consent obtained:  Verbal    Consent given by:  Patient    Risks, benefits, and alternatives were discussed: yes      Risks discussed:  Bleeding,  pain and infection    Alternatives discussed:  No treatment, delayed treatment, alternative treatment and referral  Universal protocol:     Procedure explained and questions answered to patient or proxy's satisfaction: yes      Patient identity confirmed:  Verbally with patient  Sedation:     Sedation type:  None  Anesthesia:     Anesthesia method:  None  Procedure details:     Indications: open wounds and skin infection      Wound location:  Leg    Leg location:  L lower leg    Wound age (days):  >14    Debridement: removed old alginate from lateral aspect of wound.    Skin layer closed with:     Wound care performed:  Nothing  Dressing:     Dressing applied:  Kerlix and Telfa pad    Wrapped with:  Elastic bandage 2 inch  Post-procedure details:     Procedure completion:  Tolerated well, no immediate complications      Point of Care Test & Imaging Results from this visit  No results found for this visit on 10/04/24.   No results found.    Diagnostic study results (if any) were reviewed by MILE Beck.    Assessment/Plan   Allergies, medications, history, and pertinent labs/EKGs/Imaging reviewed by MILE Beck.     Medical Decision Making  Risks, benefits, and alternatives of the medications and treatment plan prescribed today were discussed, and patient expressed understanding. Plan follow up as discussed or as needed if any worsening symptoms or change in condition. Reinforced red flags including (but not limited to): severe or worsening pain; difficulty swallowing; stiff neck; shortness of breath; coughing or vomiting blood; chest pain; and new or increased fever are indications to go to the Emergency Department.  At time of discharge patient was clinically well-appearing and HDS for outpatient management. The patient and/or family was educated regarding diagnosis, supportive care, OTC and Rx medications. The patient and/or family was given the opportunity to ask questions prior  to discharge.  They verbalized understanding of my discussion of the plans for treatment, expected course, indications to return to  or seek further evaluation in ED, and the need for timely follow up as directed.   They were provided with a work/school excuse if requested. The after-visit summary was given to the patient and care instructions were reviewed with the patient. All questions were answered and the patient verbalized understanding of the plan of care for today.      Orders and Diagnoses  Diagnoses and all orders for this visit:  Delayed wound healing  -     cephalexin (Keflex) 500 mg capsule; Take 1 capsule (500 mg) by mouth 2 times a day for 10 days.  -     Referral to Wound Clinic; Future  -     Tissue/Wound Culture/Smear  Cellulitis of left leg without foot  -     cephalexin (Keflex) 500 mg capsule; Take 1 capsule (500 mg) by mouth 2 times a day for 10 days.  -     Referral to Wound Clinic; Future  -     Tissue/Wound Culture/Smear  Other orders  -     Wound Care      Medical Admin Record      Patient disposition: Home    Electronically signed by MILE Beck  3:59 PM

## 2024-10-06 LAB
BACTERIA SPEC CULT: ABNORMAL
GRAM STN SPEC: ABNORMAL
GRAM STN SPEC: ABNORMAL

## 2024-10-07 LAB
BACTERIA SPEC CULT: ABNORMAL
GRAM STN SPEC: ABNORMAL
GRAM STN SPEC: ABNORMAL

## 2024-10-14 ENCOUNTER — APPOINTMENT (OUTPATIENT)
Dept: WOUND CARE | Facility: CLINIC | Age: 54
End: 2024-10-14
Payer: COMMERCIAL

## 2024-10-25 ENCOUNTER — OFFICE VISIT (OUTPATIENT)
Dept: WOUND CARE | Facility: CLINIC | Age: 54
End: 2024-10-25
Payer: COMMERCIAL

## 2024-10-25 DIAGNOSIS — T14.8XXD DELAYED WOUND HEALING: ICD-10-CM

## 2024-10-25 DIAGNOSIS — L03.116 CELLULITIS OF LEFT LEG WITHOUT FOOT: ICD-10-CM

## 2024-10-25 PROCEDURE — 11042 DBRDMT SUBQ TIS 1ST 20SQCM/<: CPT

## 2024-10-25 PROCEDURE — 99213 OFFICE O/P EST LOW 20 MIN: CPT | Mod: 25

## 2024-10-25 PROCEDURE — 87077 CULTURE AEROBIC IDENTIFY: CPT | Mod: OUT | Performed by: OBSTETRICS & GYNECOLOGY

## 2024-10-25 PROCEDURE — 87075 CULTR BACTERIA EXCEPT BLOOD: CPT

## 2024-10-25 PROCEDURE — 87070 CULTURE OTHR SPECIMN AEROBIC: CPT

## 2024-10-26 ENCOUNTER — LAB REQUISITION (OUTPATIENT)
Dept: LAB | Facility: HOSPITAL | Age: 54
End: 2024-10-26
Payer: COMMERCIAL

## 2024-10-26 DIAGNOSIS — L97.822 NON-PRESSURE CHRONIC ULCER OF OTHER PART OF LEFT LOWER LEG WITH FAT LAYER EXPOSED: ICD-10-CM

## 2024-10-28 ENCOUNTER — CLINICAL SUPPORT (OUTPATIENT)
Dept: WOUND CARE | Facility: CLINIC | Age: 54
End: 2024-10-28
Payer: COMMERCIAL

## 2024-10-28 PROCEDURE — 29581 APPL MULTLAYER CMPRN SYS LEG: CPT | Mod: LT

## 2024-10-31 LAB
BACTERIA SPEC CULT: ABNORMAL
BACTERIA SPEC CULT: ABNORMAL
GRAM STN SPEC: ABNORMAL
GRAM STN SPEC: ABNORMAL

## 2024-11-01 ENCOUNTER — OFFICE VISIT (OUTPATIENT)
Dept: WOUND CARE | Facility: CLINIC | Age: 54
End: 2024-11-01
Payer: COMMERCIAL

## 2024-11-01 PROCEDURE — 11042 DBRDMT SUBQ TIS 1ST 20SQCM/<: CPT

## 2024-11-08 ENCOUNTER — OFFICE VISIT (OUTPATIENT)
Dept: WOUND CARE | Facility: CLINIC | Age: 54
End: 2024-11-08
Payer: COMMERCIAL

## 2024-11-08 PROCEDURE — 11042 DBRDMT SUBQ TIS 1ST 20SQCM/<: CPT

## 2024-11-15 ENCOUNTER — OFFICE VISIT (OUTPATIENT)
Dept: WOUND CARE | Facility: CLINIC | Age: 54
End: 2024-11-15
Payer: COMMERCIAL

## 2024-11-15 PROCEDURE — 11042 DBRDMT SUBQ TIS 1ST 20SQCM/<: CPT

## 2024-11-22 ENCOUNTER — OFFICE VISIT (OUTPATIENT)
Dept: WOUND CARE | Facility: CLINIC | Age: 54
End: 2024-11-22
Payer: COMMERCIAL

## 2024-11-22 PROCEDURE — 11042 DBRDMT SUBQ TIS 1ST 20SQCM/<: CPT

## 2024-11-26 ENCOUNTER — OFFICE VISIT (OUTPATIENT)
Dept: WOUND CARE | Facility: CLINIC | Age: 54
End: 2024-11-26
Payer: COMMERCIAL

## 2024-11-26 PROCEDURE — 11042 DBRDMT SUBQ TIS 1ST 20SQCM/<: CPT

## 2024-12-17 ENCOUNTER — ANESTHESIA EVENT (OUTPATIENT)
Dept: GASTROENTEROLOGY | Facility: HOSPITAL | Age: 54
End: 2024-12-17

## 2024-12-17 PROBLEM — I10 HTN (HYPERTENSION): Status: ACTIVE | Noted: 2024-12-17

## 2024-12-17 PROBLEM — I49.9 DYSRHYTHMIAS: Status: ACTIVE | Noted: 2024-12-17

## 2024-12-17 PROBLEM — Z79.01 ANTICOAGULANT LONG-TERM USE: Status: ACTIVE | Noted: 2024-12-17

## 2024-12-17 PROBLEM — I82.409 DVT (DEEP VENOUS THROMBOSIS) (MULTI): Status: ACTIVE | Noted: 2024-12-17

## 2024-12-17 PROBLEM — I50.9 CHF (CONGESTIVE HEART FAILURE): Status: ACTIVE | Noted: 2024-12-17

## 2024-12-18 ENCOUNTER — ANESTHESIA (OUTPATIENT)
Dept: GASTROENTEROLOGY | Facility: HOSPITAL | Age: 54
End: 2024-12-18
Payer: COMMERCIAL

## 2024-12-18 NOTE — ANESTHESIA PREPROCEDURE EVALUATION
Patient: Jac Pederson    Procedure Information       Date/Time: 12/18/24 1020    Scheduled providers: Thomas Ortiz MD    Procedure: COLONOSCOPY    Location: [facility]            Relevant Problems   Cardiac   (+) CHF (congestive heart failure)   (+) Dysrhythmias (PVC)   (+) HTN (hypertension)      Hematology   (+) Anticoagulant long-term use   (+) DVT (deep venous thrombosis) (Multi)       Clinical information reviewed:                   NPO Detail:  No data recorded     PHYSICAL EXAM    Anesthesia Plan

## 2025-01-14 NOTE — PROGRESS NOTES
Primary Care Physician: Ananda Méndez MD  Patient's Location: [facility] [address]    Date of Visit: 01/28/2025  9:00 AM EST  Location of visit: [facility]   Type of Visit: New  Last visit: Visit date not found    HPI / Summary:   Jac Pederson is a very pleasant 54 y.o. male presenting for management of Stage C NICM/HFimprovedEF (previously reduced to 37% 2017 but improved to 50-55% on TTE 2022). He has a remote history of cardiac thrombus 2008 bilateral LE DVT (2017) and was on chronic anticoagulation with Xarelto 20 mg daily. His medical history also include HTN, HLD, and prediabetes, with an A1C of 6.0 (5/2024 chronic venous stasis with LE wound. He was referred to cardiology by his PCP following the detection of an irregular heart rhythm during a routine follow-up visit.      Initial CV History:   His heart failure had been managed with GDMT, including Metoprolol Succinate 50 mg daily (recently reduced from 100 mg), Lisinopril 10 mg daily, and PRN Furosemide 40 mg for volume management. He denied dyspnea, edema, or orthopnea but had reported a ~30 lb weight increase since his last visit without signs of volume overload. Cardiac testing had shown recovered EF and no recent evidence of decompensation.    The patient was referred for cardiology evaluation due to the new finding of irregular rhythm on EKG. He remained on GDMT with no current HF symptoms. A cardiology workup was requested to further assess arrhythmia management and optimize long-term heart failure care.      EKG with Sinus rhythm with PACS    Today:  He is accompanied by: self (who provides additional history)    PM/SHx: CHF, HTN, DVT   Social Hx: Denies smoking, ETOH, illicits  Family Hx: Mother had CHF      Interval Hx:   Currently denies chest pain, palpitations, shortness of breath, dyspnea on exertion, orthopnea, PND. No edema noted in BLE. Patient denies headaches, dizziness or recent falls.      Hospitalizations: 5/21-5/23/24 Knee  "replacement     ROS: Full 10 pt review of symptoms of negative unless discussed above.     Objective   Medical History:   He has a past medical history of Personal history of other specified conditions (05/07/2018) and Rash and other nonspecific skin eruption (07/12/2017).  Surgical Hx:   He has no past surgical history on file.   Social Hx:   He reports that he has never smoked. He has never used smokeless tobacco. He reports that he does not drink alcohol and does not use drugs.  Family Hx:   His family history is not on file.   Allergies:   Allergies   Allergen Reactions    Orange Hives and Itching     Exam:       1/28/2025     8:52 AM 10/4/2024     3:20 PM 9/25/2024    12:55 PM 11/28/2023    10:11 AM 4/10/2023     1:28 PM 5/25/2022     1:43 PM   Vitals   Systolic 121 110 131 103 131 100   Diastolic 58 76 80 71 70 70   BP Location Left arm Right arm  Right arm     Heart Rate 54 77 72 59 52 62   Temp  36.4 °C (97.6 °F) 36.4 °C (97.6 °F) 37.1 °C (98.8 °F) 35.5 °C (95.9 °F) 35.3 °C (95.6 °F)   Resp  15       Height 1.854 m (6' 1\") 1.829 m (6') 1.854 m (6' 1\") 1.854 m (6' 1\") 1.854 m (6' 1\") 1.829 m (6')   Weight (lb) 265.2 264 264 238 263 267.5   BMI 34.99 kg/m2 35.8 kg/m2 34.83 kg/m2 31.4 kg/m2 34.7 kg/m2 36.28 kg/m2   BSA (m2) 2.49 m2 2.47 m2 2.49 m2 2.36 m2 2.48 m2 2.48 m2   Visit Report Report Report Report Report       Wt Readings from Last 5 Encounters:   01/28/25 120 kg (265 lb 3.2 oz)   10/04/24 120 kg (264 lb)   09/25/24 120 kg (264 lb)   11/28/23 108 kg (238 lb)   04/10/23 119 kg (263 lb)     GEN: Pleasant, well-appearing, no acute distress.  HEENT: JVP not elevated, no icterus. + HJR  CHEST: No wheeze, good air movement.  CV: Normal rate, regular rhythm. Normal S1, inspiratory split S2, no m/r/g  ABD: Soft, ND, NT  EXT: Warm, well perfused, No LE edema. LLE wrapped with wound  NEURO: Pleasant, Oriented to plan    Labs:   CMP:  Recent Labs     09/25/24  1423 04/10/23  1404 11/11/21  1401 07/30/20  1625 " "08/14/19  1110    141 140 139 141   K 4.0 4.1 5.0 4.2 3.9    107 100 101 105   CO2 25 24 32 30 28   ANIONGAP 14 14 13 12 12   BUN 16 15 14 24* 16   CREATININE 1.12 1.08 1.29 1.42* 1.32*   EGFR 78  --   --   --   --    MG 1.92  --   --   --   --      Recent Labs     09/25/24  1423 04/10/23  1404 11/11/21  1401 07/30/20  1625 08/14/19  1110 06/03/19  1609   ALBUMIN 4.1 4.0 4.6 3.9   < > 4.3   ALKPHOS 112  --   --  88  --  117   ALT 17  --   --  25  --  25   AST 19  --   --  25  --  25   BILITOT 0.7  --   --  0.5  --  0.5    < > = values in this interval not displayed.   CBC:  Recent Labs     09/25/24  1423 11/05/18  1547 05/07/18  1706   WBC 6.7 6.0 8.4   HGB 15.6 14.2 14.9   HCT 45.9 42.7 45.1    240 264   MCV 92 96 97   HEME/ENDO:  Recent Labs     05/07/24  1045 04/10/23  1404 06/13/22  1351 11/11/21  1401 07/30/20  1625 11/05/18  1547   TSH  --   --   --   --   --  0.99   HGBA1C 6.0* 5.9* 6.1* 6.0*   < > 5.8    < > = values in this interval not displayed.    CARDIAC: No results for input(s): \"LDH\", \"CKMB\", \"TROPHS\", \"BNP\" in the last 66035 hours.    No lab exists for component: \"CK\", \"CKMBP\"  Recent Labs     09/25/24  1423 04/10/23  1404 07/30/20  1625 11/05/18  1547   CHOL 128 102 113 133   LDLF  --  33 44 47   LDLCALC 66  --   --   --    HDL 44.4 34.9* 34.0* 44.5   TRIG 86 169* 174* 209*   MICRO: No results for input(s): \"ESR\", \"CRP\", \"PROCAL\" in the last 47647 hours.No results found for the last 90 days.        Notable Studies, reviewed:   EKG:   Recent Labs     12/30/20  1021 08/14/19  0937 08/30/17  0914   VENTRATE 73 62 67   ATRRATE 73 62 67   QTCFRED 452 459 417   QRSDUR 102 118 104   QTCCALCB 467 460 424   No results found for this or any previous visit (from the past 4464 hours).  Echocardiogram: No results for input(s): \"EF\", \"LVIDD\", \"IVS\", \"RV\", \"RVFRWALLPKSP\", \"TAPSE\" in the last 47444 hours.  ECHOCARDIOGRAM          Coronary Angiography: [facility] 09/2022  Impression:1) The LM is a normal " vessel     2) The LAD has mild, diffuse disease in its proximal portion     3) The LCX is a normal vessel     4) The RI is a normal vessel     5) The RCA has mild, diffuse disease in the proximal portion     Recommended Treatment: Medical Therapy.     Right Heart Cath: No results found for this or any previous visit from the past 1800 days.    Cardiac Scoring: No results found for this or any previous visit from the past 1800 days.    Cardiac MRI: No results found for this or any previous visit from the past 1800 days.    Nuclear:No results found for this or any previous visit from the past 1800 days.    Metabolic Stress: No results found for this or any previous visit from the past 1800 days.      Current Outpatient Medications   Medication Instructions    atorvastatin (LIPITOR) 40 mg, oral, Daily    furosemide (LASIX) 40 mg, oral, Daily PRN    lisinopril 10 mg, oral, Daily    metoprolol succinate XL (TOPROL-XL) 50 mg, oral, Daily, Do not crush or chew.    rivaroxaban (XARELTO) 20 mg, oral, Daily, Take with food.      Notable Therapies: *GDMT*   Class  Agent SAFETY    *ARNI* / ACE / ARB  Lisinopril 10mg daily Last BP: 121/58, Last BUN/Cr (GFR): 9/25/2024: 16/1.12 (78)    *Beta-Blocker*  metoprolol succinate XL (TOPROL-XL) 50mg daily Last HR: 54    *MRA*   Last K: 9/25/2024: 4.0     *SGLT2*   Last A1c 5/7/2024: 6.0     Diuretic  furosemide (LASIX) 40mg daily (as needed) Last BNP: No results found for requested labs within last 3650 days.    Hydralazine/ISDN       Digoxin  Last Digoxin level: No results found for requested labs within last 3650 days.    Anticoagulation  rivaroxaban (XARELTO) 20mg daily Last Hgb: 9/25/2024: 15.6    Anti-arrhythmic   Last QTc: 12/30/2020: 467    Antiplatelet   Last Platelet: 9/25/2024: 311    Lipid-Lowering  atorvastatin (LIPITOR) 40 Last Tchol 9/25/2024: 128 / LDL 4/10/2023: 33 or 9/25/2024: 66 / HDL 9/25/2024: 44.4 / TRIG 9/25/2024: 86    Other        Device(s)   EF: No results found  for requested labs within last 3650 days.%, QRS: 12/30/2020: 102ms    Cardiac Rehab,   if EF <35/MI/[facility]        Problems Addressed:   # HFrEF / Chronic Systolic Heart Failure, last EF 55-60%, dx'd 2008 (EF 35%),  Attributed to alcohol use at the time. Repeat EF for assessment.   # Non-ischemic Cardiomyopathy, ACC/AHA Stage C, NYHA I, Warm, Euvolemic. Continue ACE BB  # History of AF, frequent PVCs, anethesia related PVCs: obtain holter  # Hypertension: Last BP: 121/58. Controlled on ACE  # Hyperlipidemia: Last Tchol 9/25/2024: 128 / LDL No results found for requested labs within last 365 days. or 66 / HDL 44.4 / TRIG 86. Cotninue statin  # Type II Diabetes Mellitus: Last A1c 5/7/2024: 6.0.  # Obesity: Last BMI: 35. Discussed diet/exercise  - Continue current medications  - Labwork: none  - Imaging/Procedures:   -- obtain echocardiogram  -- obtain Holter monitor  - Referrals: none  - Followup: 1 year      Patient Instructions   If you have any questions or need cardiac medication refills, please call the Heart Failure office at [phone], option 6.  You may also contact the  Heart Failure Nursing team via email at [email or web address] (Please include your name and date of birth). To reach Dr. Mathur's office please call [phone] / Fax: [phone]. To schedule an office appointment call [phone].     If I placed a referral today for a specialist/procedure, please call the general scheduling line at [phone]. You may also schedule appointments on the [employer] cat. For radiology scheduling (Cardiac calcium score, CTA with HeartFlow, Cardiac MRI, NM cardiac stress test, PET viability study) the phone number is [phone].     - Continue current medications  - Labwork: none  - Imaging/Procedures:   -- obtain echocardiogram  -- obtain Holter monitor  - Referrals: none  - Followup: 1 year       Orders:  No orders of the defined types were placed in this encounter.     Followup  Appts:  Future Appointments   Date Time Provider Department Center   3/20/2025  1:30 PM Clement Seth MD [id number]       Time Spent: I spent 45 minutes reviewing medical testing, obtaining medical history and counselling and educating on diagnosis and documenting clinical encounter. The encounter involved a comprehensive review of extensive data, including prior medical records, imaging studies, laboratory results, and consultations, followed by in-depth counseling regarding the patient's complex cardiac condition and comorbidities. We discussed treatment options, risks and benefits, and recommendations for ongoing care and careful monitoring of adverse effects from medications.     ____________________________________________________________  Jabari Mathur MD  Section of Advanced Heart Failure and Cardiac Transplantation  Division of Cardiovascular Medicine  [facility]  [facility]

## 2025-01-28 ENCOUNTER — APPOINTMENT (OUTPATIENT)
Dept: CARDIOLOGY | Facility: HOSPITAL | Age: 55
End: 2025-01-28
Payer: COMMERCIAL

## 2025-01-28 VITALS
OXYGEN SATURATION: 97 % | WEIGHT: 265.2 LBS | HEART RATE: 54 BPM | DIASTOLIC BLOOD PRESSURE: 58 MMHG | BODY MASS INDEX: 35.15 KG/M2 | SYSTOLIC BLOOD PRESSURE: 121 MMHG | HEIGHT: 73 IN

## 2025-01-28 DIAGNOSIS — I42.8 NON-ISCHEMIC CARDIOMYOPATHY (MULTI): ICD-10-CM

## 2025-01-28 DIAGNOSIS — I50.32 HEART FAILURE WITH IMPROVED EJECTION FRACTION (HFIMPEF): Primary | ICD-10-CM

## 2025-01-28 DIAGNOSIS — Z79.01 ANTICOAGULANT LONG-TERM USE: ICD-10-CM

## 2025-01-28 DIAGNOSIS — I10 PRIMARY HYPERTENSION: ICD-10-CM

## 2025-01-28 DIAGNOSIS — R00.2 PALPITATIONS: ICD-10-CM

## 2025-01-28 PROCEDURE — 1036F TOBACCO NON-USER: CPT | Performed by: STUDENT IN AN ORGANIZED HEALTH CARE EDUCATION/TRAINING PROGRAM

## 2025-01-28 PROCEDURE — 3078F DIAST BP <80 MM HG: CPT | Performed by: STUDENT IN AN ORGANIZED HEALTH CARE EDUCATION/TRAINING PROGRAM

## 2025-01-28 PROCEDURE — 3074F SYST BP LT 130 MM HG: CPT | Performed by: STUDENT IN AN ORGANIZED HEALTH CARE EDUCATION/TRAINING PROGRAM

## 2025-01-28 PROCEDURE — 99214 OFFICE O/P EST MOD 30 MIN: CPT | Performed by: STUDENT IN AN ORGANIZED HEALTH CARE EDUCATION/TRAINING PROGRAM

## 2025-01-28 PROCEDURE — 99204 OFFICE O/P NEW MOD 45 MIN: CPT | Performed by: STUDENT IN AN ORGANIZED HEALTH CARE EDUCATION/TRAINING PROGRAM

## 2025-01-28 PROCEDURE — G2211 COMPLEX E/M VISIT ADD ON: HCPCS | Performed by: STUDENT IN AN ORGANIZED HEALTH CARE EDUCATION/TRAINING PROGRAM

## 2025-01-28 PROCEDURE — 3008F BODY MASS INDEX DOCD: CPT | Performed by: STUDENT IN AN ORGANIZED HEALTH CARE EDUCATION/TRAINING PROGRAM

## 2025-01-28 ASSESSMENT — COLUMBIA-SUICIDE SEVERITY RATING SCALE - C-SSRS
2. HAVE YOU ACTUALLY HAD ANY THOUGHTS OF KILLING YOURSELF?: NO
6. HAVE YOU EVER DONE ANYTHING, STARTED TO DO ANYTHING, OR PREPARED TO DO ANYTHING TO END YOUR LIFE?: NO
1. IN THE PAST MONTH, HAVE YOU WISHED YOU WERE DEAD OR WISHED YOU COULD GO TO SLEEP AND NOT WAKE UP?: NO

## 2025-01-28 ASSESSMENT — ENCOUNTER SYMPTOMS
OCCASIONAL FEELINGS OF UNSTEADINESS: 0
DEPRESSION: 0
LOSS OF SENSATION IN FEET: 0

## 2025-01-28 ASSESSMENT — PATIENT HEALTH QUESTIONNAIRE - PHQ9
2. FEELING DOWN, DEPRESSED OR HOPELESS: NOT AT ALL
1. LITTLE INTEREST OR PLEASURE IN DOING THINGS: NOT AT ALL
SUM OF ALL RESPONSES TO PHQ9 QUESTIONS 1 AND 2: 0

## 2025-01-28 ASSESSMENT — PAIN SCALES - GENERAL: PAINLEVEL_OUTOF10: 0-NO PAIN

## 2025-01-28 NOTE — PROGRESS NOTES
54 year old male here for cardiovascular evaluation of heart failure.     PM/SHx: CHF, HTN, DVT   Social Hx: Denies smoking, ETOH, illicits  Family Hx: Mother had CHF     Interval Hx:   Currently denies chest pain, palpitations, shortness of breath, dyspnea on exertion, orthopnea, PND. No edema noted in BLE. Patient denies headaches, dizziness or recent falls.     Hospitalizations: 5/21-5/23/24 Knee replacement

## 2025-01-28 NOTE — PATIENT INSTRUCTIONS
If you have any questions or need cardiac medication refills, please call the Heart Failure office at [phone], option 6.  You may also contact the  Heart Failure Nursing team via email at [email or web address] (Please include your name and date of birth). To reach Dr. Mathur's office please call [phone] / Fax: [phone]. To schedule an office appointment call [phone].     If I placed a referral today for a specialist/procedure, please call the general scheduling line at [phone]. You may also schedule appointments on the [employer] cat. For radiology scheduling (Cardiac calcium score, CTA with HeartFlow, Cardiac MRI, NM cardiac stress test, PET viability study) the phone number is [phone].     - Continue current medications  - Labwork: none  - Imaging/Procedures:   -- obtain echocardiogram  -- obtain Holter monitor  - Referrals: none  - Followup: 1 year

## 2025-03-11 ENCOUNTER — TELEPHONE (OUTPATIENT)
Dept: PRIMARY CARE | Facility: HOSPITAL | Age: 55
End: 2025-03-11
Payer: COMMERCIAL

## 2025-03-11 DIAGNOSIS — E78.00 HIGH CHOLESTEROL: ICD-10-CM

## 2025-03-11 DIAGNOSIS — I10 PRIMARY HYPERTENSION: ICD-10-CM

## 2025-03-11 DIAGNOSIS — I26.99 PULMONARY EMBOLISM, UNSPECIFIED CHRONICITY, UNSPECIFIED PULMONARY EMBOLISM TYPE, UNSPECIFIED WHETHER ACUTE COR PULMONALE PRESENT (MULTI): ICD-10-CM

## 2025-03-11 DIAGNOSIS — I50.9 HEART FAILURE, UNSPECIFIED HF CHRONICITY, UNSPECIFIED HEART FAILURE TYPE: ICD-10-CM

## 2025-03-11 RX ORDER — METOPROLOL SUCCINATE 50 MG/1
50 TABLET, EXTENDED RELEASE ORAL DAILY
Qty: 90 TABLET | Refills: 2 | Status: SHIPPED | OUTPATIENT
Start: 2025-03-11 | End: 2025-12-06

## 2025-03-11 RX ORDER — ATORVASTATIN CALCIUM 40 MG/1
40 TABLET, FILM COATED ORAL DAILY
Qty: 30 TABLET | Refills: 5 | Status: SHIPPED | OUTPATIENT
Start: 2025-03-11 | End: 2025-09-07

## 2025-03-11 RX ORDER — LISINOPRIL 10 MG/1
10 TABLET ORAL DAILY
Qty: 30 TABLET | Refills: 5 | Status: SHIPPED | OUTPATIENT
Start: 2025-03-11 | End: 2025-09-07

## 2025-03-11 NOTE — TELEPHONE ENCOUNTER
Patient requesting refills on Xarelto, Lisinopril, Atorvastatin and Metoprolol    [facility] [id number]

## 2025-03-18 ENCOUNTER — OFFICE VISIT (OUTPATIENT)
Dept: URGENT CARE | Age: 55
End: 2025-03-18
Payer: MEDICAID

## 2025-03-18 ENCOUNTER — ANCILLARY PROCEDURE (OUTPATIENT)
Dept: URGENT CARE | Age: 55
End: 2025-03-18
Payer: COMMERCIAL

## 2025-03-18 VITALS
HEART RATE: 114 BPM | OXYGEN SATURATION: 95 % | SYSTOLIC BLOOD PRESSURE: 121 MMHG | DIASTOLIC BLOOD PRESSURE: 82 MMHG | RESPIRATION RATE: 20 BRPM | TEMPERATURE: 98.8 F

## 2025-03-18 DIAGNOSIS — M79.89 SWELLING OF RIGHT HAND: ICD-10-CM

## 2025-03-18 DIAGNOSIS — S69.91XA HAND INJURY, RIGHT, INITIAL ENCOUNTER: Primary | ICD-10-CM

## 2025-03-18 PROCEDURE — 99214 OFFICE O/P EST MOD 30 MIN: CPT | Performed by: PHYSICIAN ASSISTANT

## 2025-03-18 PROCEDURE — 3074F SYST BP LT 130 MM HG: CPT | Performed by: PHYSICIAN ASSISTANT

## 2025-03-18 PROCEDURE — 1036F TOBACCO NON-USER: CPT | Performed by: PHYSICIAN ASSISTANT

## 2025-03-18 PROCEDURE — 3079F DIAST BP 80-89 MM HG: CPT | Performed by: PHYSICIAN ASSISTANT

## 2025-03-18 PROCEDURE — 73130 X-RAY EXAM OF HAND: CPT | Mod: RIGHT SIDE | Performed by: PHYSICIAN ASSISTANT

## 2025-03-18 RX ORDER — METHYLPREDNISOLONE 4 MG/1
TABLET ORAL
Qty: 21 TABLET | Refills: 0 | Status: SHIPPED | OUTPATIENT
Start: 2025-03-18 | End: 2025-03-24

## 2025-03-18 ASSESSMENT — ENCOUNTER SYMPTOMS
PSYCHIATRIC NEGATIVE: 1
HEMATOLOGIC/LYMPHATIC NEGATIVE: 1
CONSTITUTIONAL NEGATIVE: 1
COLOR CHANGE: 0
WOUND: 0
GASTROINTESTINAL NEGATIVE: 1
RESPIRATORY NEGATIVE: 1
CARDIOVASCULAR NEGATIVE: 1
ENDOCRINE NEGATIVE: 1
ARTHRALGIAS: 1
ALLERGIC/IMMUNOLOGIC NEGATIVE: 1
NEUROLOGICAL NEGATIVE: 1
JOINT SWELLING: 1
EYES NEGATIVE: 1

## 2025-03-18 NOTE — PROGRESS NOTES
Subjective   Patient ID: Jac Pederson is a 54 y.o. male. They present today with a chief complaint of Arm Swelling (Has had swelling since Feb 28th was seen and given an ace wrap however no improvement.).    History of Present Illness    History provided by:  Patient   used: No      This is a 54 yr old male here for right hand pain and swelling since 2/28/2025. Seen at outside ER where forearm and wrist xrays done and negative. Playing foot ball and thinks hand and wrist got bent back. No open wound.   Past Medical History  Allergies as of 03/18/2025 - Reviewed 03/18/2025   Allergen Reaction Noted    Orange Hives and Itching 06/12/2015       (Not in a hospital admission)       Past Medical History:   Diagnosis Date    Personal history of other specified conditions 05/07/2018    History of insomnia    Rash and other nonspecific skin eruption 07/12/2017    Rash       No past surgical history on file.     reports that he has never smoked. He has never used smokeless tobacco. He reports that he does not drink alcohol and does not use drugs.    Review of Systems  Review of Systems   Constitutional: Negative.    HENT: Negative.     Eyes: Negative.    Respiratory: Negative.     Cardiovascular: Negative.    Gastrointestinal: Negative.    Endocrine: Negative.    Genitourinary: Negative.    Musculoskeletal:  Positive for arthralgias and joint swelling.   Skin:  Negative for color change and wound.   Allergic/Immunologic: Negative.    Neurological: Negative.    Hematological: Negative.    Psychiatric/Behavioral: Negative.     All other systems reviewed and are negative.       Objective    Vitals:    03/18/25 1314   BP: 121/82   Pulse: (!) 114   Resp: 20   Temp: 37.1 °C (98.8 °F)   SpO2: 95%     No LMP for male patient.    Physical Exam  Vitals and nursing note reviewed.   Constitutional:       Appearance: Normal appearance.   Cardiovascular:      Rate and Rhythm: Normal rate and regular rhythm.    Pulmonary:      Effort: Pulmonary effort is normal.      Breath sounds: Normal breath sounds.   Musculoskeletal:      Comments: Right hand-dorsal soft tissue swelling, pain with palpation and limited ROM secondary to swelling and pain. Right wrist NT   Skin:     General: Skin is warm and dry.   Neurological:      General: No focal deficit present.      Mental Status: He is alert and oriented to person, place, and time.   Psychiatric:         Mood and Affect: Mood normal.         Behavior: Behavior normal.         Procedures    Point of Care Test & Imaging Results from this visit  No results found for this visit on 03/18/25.   XR hand right 3+ views    Result Date: 3/18/2025  Interpreted By:  Lisbeth Correa, STUDY: XR HAND RIGHT 3+ VIEWS 3/18/2025 1:54 pm   INDICATION: Signs/Symptoms:hand pain and swelling, remote injury   COMPARISON: None available.   ACCESSION NUMBER(S): [id number]   ORDERING CLINICIAN: MARZENA SAUCEDO   TECHNIQUE: Three views of right hand are completed.   FINDINGS: There is osteoarthritis of the distal interphalangeal joint of the index finger noted with moderate-sized osteophytes arising from the adjacent articular surfaces. The 2nd MCP joint is mildly narrowed with osteophytosis of the proximal aspect of the proximal phalanx of the index finger. There is erosion of the ulnar styloid process and of the triquetral bone with additional small erosions of the distal navicular bone and the hamate with marked soft tissue swelling of the wrist and dorsal aspect of the hand.   No fracture or dislocation of the right hand or wrist is seen.       Soft tissue swelling of the wrist and hand particularly notable along the dorsal aspect of the hand with areas of bony erosion in the ulnar styloid process and carpal bones which may be the result of a rheumatoid arthritis.   There is osteoarthritis of the distal interphalangeal joint of the index finger and of the 2nd MCP joint.   Signed by: Lisbeth Correa  3/18/2025 2:20 PM Dictation workstation:   [id number]     Diagnostic study results (if any) were reviewed by Chika Allen PA-C.    Assessment/Plan   Allergies, medications, history, and pertinent labs/EKGs/Imaging reviewed by Chika Allen PA-C.     Orders and Diagnoses  Diagnoses and all orders for this visit:  Hand injury, right, initial encounter  -     XR hand right 3+ views  Swelling of right hand  -     methylPREDNISolone (Medrol Dospak) 4 mg tablets; Taper as directed  -     Referral to Orthopaedic Surgery; Future    Plan:  Med as above  Ice and elevate right hand 2-3 times a day  Orthopedic referral placed if not improving or worsening  ER visit anytime 24/7 for acute worsening or changing condition      Patient disposition: Home    Electronically signed by Chika Allen PA-C  5:53 PM

## 2025-03-18 NOTE — PATIENT INSTRUCTIONS
Ice and elevate hand 2-3 times a day for pain or swelling  See the orthopedic specialist if not improving or worsening  ER visit anytime 24/7 for acute worsening or changing condition

## 2025-03-20 ENCOUNTER — APPOINTMENT (OUTPATIENT)
Dept: PRIMARY CARE | Facility: HOSPITAL | Age: 55
End: 2025-03-20
Payer: COMMERCIAL

## 2025-03-20 NOTE — PROGRESS NOTES
Chief complaint:   Follow-up Care     HPI:  Jac Pederson is a 54 y.o. male w/ PMH of HF (prev HFrEF, now recovered, TTE 11/2022 EF 50-55%), DVT (B/l LE 2017, on Xarelto), HTN, HLD, Pre DM (last A1C 5/7 6.0), B/l Chronic Stasis Dermatitis, Bl TKA (last was L in 5/2024, prior R c/b femur fx requiring ORIf) and healing LLE wound to lateral ankle who presents for f/up care.       Pt denies any complaints of fever, chills, headache, N/V, chest pain, shortness of breath, abdominal pain, constipation/diarrhea, dysuria.      Of note, when pt was last seen in Primary Care 9/2024, he was w/ abnormal heart rhythm and EKG showed Afib. He also had ~30Ibs weight gain but w/o evidence of volume overload. Referral was made to Cards for HF management and Afib burden. Pt met w/ Cards 1/28 for HF and arrhythmia management. Was seen to be in NSR w/ PACs. Holter monitor ordered and repeat TTE. Plan to c/w current care and return in 1 year.     Has home wound care and LLE ulcer tissue specimen was seen to grow MRSA, Klebsiella pneumoniae/variolla. He was treated w/ PO Keflex. Pt had excisional debridement 12/2 of LLE venous stasis ulcer after found to be draining purulent material. Placed in Unna boot and was last seen to be doing well.   Pt was recently seen in the ED 2/26 for 3 days of R wrist pain and swelling from trauma whilst playing with grandson.Thought to be sprain as no rad evidence of fx. Pt was seen at Urgent Care 3/18 for recurrence of swelling. RICE measures, PO methylpred given and Ortho referral made.  Last set of labs 9/2024; CBC, CMP, Mg, Phos, Lipid Panel all normal. Colonoscopy not completed.     Housekeeping Items:  Equipment/Medications refills -   Medlist review -   Verify Pharmacy -   Care Gaps review - Medicare Annual Wellness visit, A1C   Labs today -  Changes in ADLs/iADLs -     Review of systems:  As stated in HPI    Medications:  Current Outpatient Medications   Medication Instructions    atorvastatin  (LIPITOR) 40 mg, oral, Daily    furosemide (LASIX) 40 mg, oral, Daily PRN    lisinopril 10 mg, oral, Daily    methylPREDNISolone (Medrol Dospak) 4 mg tablets Taper as directed    metoprolol succinate XL (TOPROL-XL) 50 mg, oral, Daily, Do not crush or chew.    rivaroxaban (XARELTO) 20 mg, oral, Daily, Take with food.     Allergies:  Allergies   Allergen Reactions    Orange Hives and Itching     Past medical history:  Past Medical History:   Diagnosis Date    Personal history of other specified conditions 05/07/2018    History of insomnia    Rash and other nonspecific skin eruption 07/12/2017    Rash     Surgical history:  TOTAL KNEE REPLACEMENT Right 07/2022   TOTAL KNEE REPLACEMENT Left 05/2024    Family history:  Diabetes Mother   Heart Failure Mother   Hypertension Father   Cancer Father     Social history:   reports that he has never smoked. He has never used smokeless tobacco. He reports that he does not drink alcohol and does not use drugs.    Health maintenance:  Health Maintenance   Topic Date Due    Medicare Annual Wellness Visit (AWV)  Never done    MMR Vaccines (1 of 1 - Standard series) Never done    Hepatitis B Vaccines (1 of 3 - 19+ 3-dose series) Never done    Echocardiogram  07/11/2023    COVID-19 Vaccine (5 - 2024-25 season) 09/01/2024    Diabetes: Hemoglobin A1C  05/07/2025    Diabetes Screening  05/07/2025    Creatinine Level  09/25/2025    Potassium Level  09/25/2025    DTaP/Tdap/Td Vaccines (2 - Td or Tdap) 07/12/2027    Lipid Panel  09/25/2029    Colorectal Cancer Screening  07/07/2030    Influenza Vaccine  Completed    Pneumococcal Vaccine  Completed    Zoster Vaccines  Completed    HIV Screening  Completed    Hepatitis C Screening  Completed    HIB Vaccines  Aged Out    IPV Vaccines  Aged Out    Hepatitis A Vaccines  Aged Out    Meningococcal Vaccine  Aged Out    Rotavirus Vaccines  Aged Out    HPV Vaccines  Aged Out       Sexual History:  Currently Sexually Active: {YES  (DEF)/NO:78141}  Partners: {Sexual History Gender List:09551}  LMP: {Sexual History Menstrual Hx:07788}  Contraception: {YES-DESCRIBE/NO:89848}  STI Concern/Hx: {STI SP:48699}      Vitals:  There were no vitals filed for this visit.    Physical Exam:  General: Awake, alert, conversant, appears stated age, Obese  HEENT: Pupils equal and round, no scleral icterus or conjunctivitis  Skin: Healing wound to L outer ankle. B/l Stasis Dermatitis present.   Chest: Ctab, normal respiratory effort, not on supplemental oxygen  Cardiac: Regular rate and rhythm, normal s1, s2, no M/R/G, no JVD  Abdomen: Soft, ND, NT, no involuntary guarding  : No flank pain or indwelling urinary catheter  EXT: No peripheral edema, no asymmetry noted  MSK: No focal joint swelling noted  Neuro: AOx4, moving all limbs spontaneously, follows commands  Psych: Coherent thought process, appropriate mood and affect    Labs:  Lab Results   Component Value Date    WBC 6.7 09/25/2024    HGB 15.6 09/25/2024    HCT 45.9 09/25/2024    MCV 92 09/25/2024     09/25/2024     Lab Results   Component Value Date    GLUCOSE 89 09/25/2024    CALCIUM 9.7 09/25/2024     09/25/2024    K 4.0 09/25/2024    CO2 25 09/25/2024     09/25/2024    BUN 16 09/25/2024    CREATININE 1.12 09/25/2024     Lab Results   Component Value Date    HGBA1C 6.0 (H) 05/07/2024      Lab Results   Component Value Date    CHOL 128 09/25/2024    CHOL 102 04/10/2023    CHOL 113 07/30/2020     Lab Results   Component Value Date    HDL 44.4 09/25/2024    HDL 34.9 (A) 04/10/2023    HDL 34.0 (A) 07/30/2020     Lab Results   Component Value Date    LDLCALC 66 09/25/2024     Lab Results   Component Value Date    TRIG 86 09/25/2024    TRIG 169 (H) 04/10/2023    TRIG 174 (H) 07/30/2020       Imaging:  XR hand right 3+ views  Result Date: 3/18/2025  No fracture or dislocation of the right hand or wrist is seen.     Soft tissue swelling of the wrist and hand particularly notable along the  dorsal aspect of the hand with areas of bony erosion in the ulnar styloid process and carpal bones which may be the result of a rheumatoid arthritis.   There is osteoarthritis of the distal interphalangeal joint of the index finger and of the 2nd MCP joint.     XR forearm right 2 views  Result Date: 2/26/2025  No acute osseous abnormality.         Assessment and plan:  Jac Pederson is a 54 y.o. male w/ PMH of HF (prev HFrEF, now recovered, TTE 11/2022 EF 50-55%), DVT (B/l LE 2017, on Xarelto), HTN, HLD, Pre DM (last A1C 5/7 6.0), B/l Chronic Stasis Dermatitis, Bl TKA (last was L in 5/2024, prior R c/b femur fx requiring ORIf) and healing LLE wound to lateral ankle who presents for f/up care.         Encounter summary:       #HFpEF (previous HFrEF but recovered in 2017)  #[place]-operative [place]-operational (July 2022)  #HTN  #HLD  ::EF improved on TTE from 6/2022 to 50-55%  ::EKG 9/25 showing Afib rhythm; not symptomatic. On Cards visit, NSR, PACs.   ::Seen last by Cards 1/28 and seen to be in NSR w/ PACs. Holter monitor ordered and repeat TTE.   - C/w Lisinopril 10mg daily  - C/w Metoprolol Succinate 50mg daily  - C/w Furosemide 40mg PRN for 3Ibs weight gain over 3 days   - C/w Atorvastatin 40mg daily  - Fu future Cardiology appointments  - Fu Holter results, TTE      #Right knee OA   #LLE wound   ::S/p TKA, c/b Rt femoral fracture s/p ORIF - July 2022, [facility]  ::S/p TKA L TKA 5/2024  ::LLE wound from 4/2024 after accidental laceration trauma. 10/2024 tissue cx grew MRSA, Klebsiella pneumoniae/variolla and treated w/ PO Keflex. Had debridement 12/2024.  ::Has [facility] for LLE wound assistance   - Follows w/ [facility] for Knees  - Home Health care to help w/ LLE wound dressing     #R wrist pain swelling  ::Following traumatic event whilst w/ grandson. Seen at Urgent care XR w/o fx. Continues to have persistent pain 3/18 and given RICE instructions; referral to Ortho Hand.  - Cont to do local massages and ice. PO pain meds  PRN.   - Fu w/ Ortho Hand     #DVT/PE (4/2017)  #History of cardiac thrombus (2008)  - C/w Rivaroxaban 20mg daily     #CKD  :: Baseline CR 1.0-1.4  - F/up CMP, Mg, Phos      #Pre-Diabetes (A1c 6.0 5/2024)  :: Previously managed on Metformin  :: Currently diet controlled  - C/w current diet plan for weight loss  - A1C ordered for next labs      #Health maintenance  - Colonoscopy ordered   - HIV (2017): negative  - Hep B/C (2017): negative  - TSH (2018): 0.99  - Lipid panel (next 9/2025)  - Pneumovax 23 2018, PREVNAR 20 9/2024 - next after 65  - Shingles: 12/2019 and 2/2020  - COVID x3  - Flu 2024      Items for Next Visit:    Follow-up in ***.    Patient and plan discussed with attending physician {[facility] attendings:[id number]}.    Clement Seth MD  PGY2, Internal Medicine

## 2025-05-05 DIAGNOSIS — I50.9 HEART FAILURE, UNSPECIFIED HF CHRONICITY, UNSPECIFIED HEART FAILURE TYPE: ICD-10-CM

## 2025-05-06 RX ORDER — FUROSEMIDE 40 MG/1
40 TABLET ORAL DAILY PRN
Qty: 30 TABLET | Refills: 0 | Status: SHIPPED | OUTPATIENT
Start: 2025-05-06 | End: 2025-06-05

## 2025-05-13 ENCOUNTER — APPOINTMENT (OUTPATIENT)
Dept: PRIMARY CARE | Facility: HOSPITAL | Age: 55
End: 2025-05-13
Payer: MEDICARE

## 2025-05-27 ENCOUNTER — OFFICE VISIT (OUTPATIENT)
Dept: PRIMARY CARE | Facility: HOSPITAL | Age: 55
End: 2025-05-27
Payer: MEDICARE

## 2025-05-27 VITALS
SYSTOLIC BLOOD PRESSURE: 139 MMHG | HEIGHT: 73 IN | HEART RATE: 76 BPM | BODY MASS INDEX: 34.99 KG/M2 | TEMPERATURE: 96.8 F | DIASTOLIC BLOOD PRESSURE: 83 MMHG | OXYGEN SATURATION: 98 % | WEIGHT: 264 LBS

## 2025-05-27 DIAGNOSIS — I10 PRIMARY HYPERTENSION: ICD-10-CM

## 2025-05-27 DIAGNOSIS — I26.99 PULMONARY EMBOLISM, UNSPECIFIED CHRONICITY, UNSPECIFIED PULMONARY EMBOLISM TYPE, UNSPECIFIED WHETHER ACUTE COR PULMONALE PRESENT (MULTI): ICD-10-CM

## 2025-05-27 DIAGNOSIS — I50.9 HEART FAILURE, UNSPECIFIED HF CHRONICITY, UNSPECIFIED HEART FAILURE TYPE: Primary | ICD-10-CM

## 2025-05-27 DIAGNOSIS — E78.00 HIGH CHOLESTEROL: ICD-10-CM

## 2025-05-27 PROCEDURE — 99214 OFFICE O/P EST MOD 30 MIN: CPT | Mod: 25,GC

## 2025-05-27 PROCEDURE — 93005 ELECTROCARDIOGRAM TRACING: CPT

## 2025-05-27 RX ORDER — METOPROLOL SUCCINATE 50 MG/1
50 TABLET, EXTENDED RELEASE ORAL DAILY
Qty: 90 TABLET | Refills: 2 | Status: SHIPPED | OUTPATIENT
Start: 2025-05-27 | End: 2026-02-21

## 2025-05-27 RX ORDER — FUROSEMIDE 40 MG/1
40 TABLET ORAL DAILY PRN
Qty: 30 TABLET | Refills: 0 | Status: SHIPPED | OUTPATIENT
Start: 2025-05-27 | End: 2025-05-27

## 2025-05-27 RX ORDER — LISINOPRIL 10 MG/1
10 TABLET ORAL DAILY
Qty: 30 TABLET | Refills: 5 | Status: SHIPPED | OUTPATIENT
Start: 2025-05-27 | End: 2025-11-23

## 2025-05-27 RX ORDER — FUROSEMIDE 40 MG/1
40 TABLET ORAL DAILY PRN
Qty: 30 TABLET | Refills: 3 | Status: SHIPPED | OUTPATIENT
Start: 2025-05-27 | End: 2025-09-24

## 2025-05-27 RX ORDER — ATORVASTATIN CALCIUM 40 MG/1
40 TABLET, FILM COATED ORAL DAILY
Qty: 30 TABLET | Refills: 5 | Status: SHIPPED | OUTPATIENT
Start: 2025-05-27 | End: 2025-11-23

## 2025-05-27 ASSESSMENT — ENCOUNTER SYMPTOMS
DEPRESSION: 0
LOSS OF SENSATION IN FEET: 0
OCCASIONAL FEELINGS OF UNSTEADINESS: 0

## 2025-05-27 ASSESSMENT — PAIN SCALES - GENERAL: PAINLEVEL_OUTOF10: 0-NO PAIN

## 2025-05-27 NOTE — PROGRESS NOTES
"  [facility]    HPI:  Jac Pederson is a 54 y.o. male w/ a PMH s/f Stage C NICM/HFimpEF (35% 2017 --> 55% 2022), DVT (B/l LE 2017, on Xarelto), HTN, HLD, Pre DM (last A1C 5/7 6.0), B/l Chronic Stasis Dermatitis, Bl TKA (last was L in 5/2024, prior R c/b femur fx requiring ORIF) and healing LLE wound to lateral ankle who presents for f/up care.     Patient last seen in [facility] on 9/25/2024. At the time, the patient was noted to have an irregular rhythm on his EKG.     Cardiology history per chart review  \"His heart failure had been managed with GDMT, including Metoprolol Succinate 50 mg daily (recently reduced from 100 mg), Lisinopril 10 mg daily, and PRN Furosemide 40 mg for volume management. He denied dyspnea, edema, or orthopnea but had reported a ~30 lb weight increase since his last visit without signs of volume overload. Cardiac testing had shown recovered EF and no recent evidence of decompensation. \"    Patient was then referred to cardiology and was seen by Dr. Mathur for his new Afib and to optimize his long-term heart failure care. A repeat TTE was ordered along with holter to assess his PVC/arrhythmia burden. The patient was advised to continue his ACEi and BB, along with his DOAC (Xarelto) that he is already on for his history of his prior bilateral DVTs in 2017.     Since last visit at [facility], patient has had several visits to various healthcare setups primarily for his left lower leg ulcer and wound. The patient has since undergone several excisional debridements of his LLE wound/ulcer. He receives his wound care/debridements with Dr. Viveros at [facility] wound healing center. Notably, patient had an ED visit on 2/2025. He presented to the ED for right wrist pain swelling lasting 3 days. X-ray of the right wrist and forearm were obtained and showed no acute osseous abnormality. There were some degenerative changes but otherwise no fracture or dislocation. Patient given a " "dose of oxycodone in the ED and discharged with instructions to ACE wrap and use wrist splint. He was also given a follow-up orthopedics appointment.     Today Per Patient:  In the clinic today, the patient states that his wrist pain has resolved. He states that he gets his wound care at [facility] and has not noticed any drainage from his wounds recently. He denies chest pain, palpitations, shortness of breath, orthopnea, or PND. Denies syncope. ROS is otherwise negative. Weight in clinic today is X compared to 265lbs on his most recent previous visit. Patient notes that he saw Dr. Mathur from cardiology but was unfortunately unable to make it to his echocardiogram or holter appointments. He denies any fevers, chills, night sweats or weight loss.     He says he has been in his usual state of health and feels \"great\". He says he has been walking \"a couple miles\" twice a week. He has to take a break after a while but denies any leg pain/chest pain/shortness of breath. He uses lasix PRN; he used it twice \"last month\". He checks his weight every day; today, it was 264lbs. He takes his lasix if he notices a >3 lbs weight gain every day.     EKG in clinic shows NSR with rates ~75 BPM, LAD, low-voltage in V1, incomplete RBBB.     Objective   Past Medical History: Medical History[1]    Allergies: RX Allergies[2]    Surgical History: L and R TKA     Family History: Father - Prostate CA     Social history:  reports that he has never smoked. He has never used smokeless tobacco. He reports that he does not drink alcohol and does not use drugs.   Social History     Social History Narrative    Not on file       Medications:Current Medications[3]    Vitals: There were no vitals filed for this visit.    Physical exam:  Constitutional: Well-developed male in no acute distress. Obese male.   HEENT: NC/AT, sclera anicteric  Respiratory: CTAB. No wheezes, rales, or rhonchi. Normal respiratory effort.  Cardiovascular: " Irregular rhythm, regular HR. S4 appreciate. No S3, friction rub appreciated. No JVD.   Abdominal: Soft, nondistended, nontender to palpation. Bowel sounds present. No hepatosplenomegaly or masses.   Neuro: AAOx3. CN II-XII grossly intact. Tongue midline, no facial droop  MSK: No LE edema bilaterally. Moving extremities well. WWP.   Skin: Warm, dry. Bilateral stasis dermatitis. Healing left lateral malleolus wound.   Psych: Appropriate mood and affect.    Labs: No results found for this or any previous visit (from the past 24 hours).    Imaging: Imaging  No results found.    Health maintenance     Health Maintenance   Topic Date Due    Medicare Annual Wellness Visit (AWV)  Never done    Colorectal Cancer Screening  Never done    MMR Vaccines (1 of 1 - Standard series) Never done    Hepatitis B Vaccines (1 of 3 - 19+ 3-dose series) Never done    Pneumococcal Vaccine: Pediatrics (0 to 5 Years) and At-Risk Patients (6 to 64 Years) (2 of 2 - PCV) 01/17/2019    Echocardiogram  07/11/2023    Creatinine Level  04/10/2024    Potassium Level  04/10/2024    Influenza Vaccine (1) 09/01/2024    COVID-19 Vaccine (5 - 2023-24 season) 09/01/2024    Diabetes: Hemoglobin A1C  05/07/2025    Diabetes Screening  05/07/2025    DTaP/Tdap/Td Vaccines (2 - Td or Tdap) 07/12/2027    Lipid Panel  04/10/2028    Zoster Vaccines  Completed    HIV Screening  Completed    Hepatitis C Screening  Completed    HIB Vaccines  Aged Out    IPV Vaccines  Aged Out    Hepatitis A Vaccines  Aged Out    Meningococcal Vaccine  Aged Out    Rotavirus Vaccines  Aged Out    HPV Vaccines  Aged Out         Assessment and Plan   Jac Pederson is a 54 y.o. male w/ a PMH s/f Stage C NICM/HFimpEF (35% 2017 --> 55% 2022), DVT (B/l LE 2017, on Xarelto), HTN, HLD, Pre DM (last A1C 5/7 6.0), B/l Chronic Stasis Dermatitis, Bl TKA (last was L in 5/2024, prior R c/b femur fx requiring ORIF) and healing LLE wound to lateral ankle who presents for f/up care. He was seen by   Kevan for his new Afib and to optimize his long-term heart failure care. Unfortunately, he has been unable to obtain either of these. On ROS, he denies any PND, orthopnea. He appears euvolemic on clinical exam today and states that he has been compliant with his medications. Interval review is notable for debridements of his LLE wound for which he receives care at [facility]. He has follow-up appointments with Dr. Mathur from Cardiology and Dr. Viveros from his wound clinic. Given his obesity, hypertension, and atrial fibrillation, his H2FPEF score puts him at almost a 72% probability of HFpEF.     Refills Provided This Visit:    Metoprolol succinate, atorvastatin, xarelto, furosemide, lisinopril    Today in short  - Re-ordered TTE and holter - strongly suspect he has diastolic dysfunction and this was not previously assessed.   - Refilled above meds   - Repeat labs - CBC, CMP, Mag, Lipid profile, HbA1c, BNP  - Encouraged to schedule colonoscopy appointment     #Stage C NICM/HFimpEF (35% 2017 --> 55% 2022)  #[place]-operative [place] dana-operational (July 2022)  #HTN  #HLD  #Afib - [place]?DS?-VASc 5  :: Saw Dr. Mathur, planned for repeat TTE and holter but did not receive these   :: EKG 9/25 showing Afib; not symptomatic    :: EKG in clinic today shows NSR with rates ~75 BPM, LAD, low-voltage in V1, incomplete RBBB.     Plan  - C/w Lisinopril 10mg daily  - C/w Metoprolol Succinate 50mg daily for rate-control and/or PVC suppression  - C/w Furosemide 40mg PRN for 3Ibs weight gain over 3 days   - C/w Atorvastatin 40mg daily - previous lipid panel 9/2024 with LDL 66 [id number]  - C/w xarelto   - Advised to undergo holter and TTE, repeat orders placed  - Repeat labs - CBC, CMP, Mag, Lipid profile, HbA1c, BNP     #Right knee OA   #LLE wound   #Right LE wound   :: Suspect wounds likely from venous insufficiency   :: S/p TKA, c/b Rt femoral fracture s/p ORIF - July 2022, [facility]  :: S/p TKA L TKA 5/2024  :: LLE wound  from 4/2024 after accidental laceration trauma     Plan   - Follows w/ [facility] for Knees  - Follow up with [facility] wound clinic - appointment tomorrow       #DVT/PE (4/2017)  #History of cardiac thrombus (2008)  - C/w Rivaroxaban 20mg daily     #CKD  :: Baseline CR 1.0-1.4  - F/up CMP, Mg, Phos   - Consider urine albumin and creatinine for UACR on next visit (already on ACEi).      #Pre-Diabetes (A1c 6.0 5/2024)  :: Previously managed on Metformin  :: Currently diet controlled  - C/w w/ current diet plan  - Will repeat A1c today     #Health maintenance  - Colonoscopy ordered, pending   - HIV (2017): negative  - Hep B/C (2017): negative  -TSH (2018): 0.99  - Lipid panel 2024: Tchol 128, LDL 66 HDL 44.4 TG 86, will repeat  - Pneumovax 23 2018, PREVNAR 20 9/2024 - next after 64  - Shingles: 12/2019 and 2/2020  - COVID x3  - Flu 2024     Items for Next Visit:  Follow-up Labs, TTE, holter, colonoscopy, volume assessment      Patient and plan discussed with attending physician Dr. Sampson, who agrees with it.     Caroline Subramanian MD  PGY2 Internal Medicine  [facility]         [1]   Past Medical History:  Diagnosis Date    Personal history of other specified conditions 05/07/2018    History of insomnia    Rash and other nonspecific skin eruption 07/12/2017    Rash   [2]   Allergies  Allergen Reactions    Orange Hives and Itching   [3]   Current Outpatient Medications:     atorvastatin (Lipitor) 40 mg tablet, Take 1 tablet (40 mg) by mouth once daily., Disp: 30 tablet, Rfl: 5    furosemide (Lasix) 40 mg tablet, Take 1 tablet (40 mg) by mouth once daily as needed (take if weight gain greater than 3 lbs in 3 days)., Disp: 30 tablet, Rfl: 0    lisinopril 10 mg tablet, Take 1 tablet (10 mg) by mouth once daily., Disp: 30 tablet, Rfl: 5    metoprolol succinate XL (Toprol-XL) 50 mg 24 hr tablet, Take 1 tablet (50 mg) by mouth once daily. Do not crush or chew., Disp: 90 tablet, Rfl: 2    rivaroxaban  (Xarelto) 20 mg tablet, Take 1 tablet (20 mg) by mouth once daily. Take with food., Disp: 30 tablet, Rfl: 11

## 2025-05-27 NOTE — PATIENT INSTRUCTIONS
Dear  Bg,     Thank you for seeing us in the clinic today.     Today, we discussed the importance of getting a repeat echocardiogram and holter monitor; this will allow us to examine the burden of your abnormal heart rhythm, which is called atrial fibrillation. Your EKG in the clinic today shows normal rhythm; however, this could simply mean that your heart is flipping in and out of this abnormal rhythm, so it would be helpful to get an objective estimate.      The repeat echocardiogram will show us how your heart function is.     You will have to call the scheduling department to schedule appointments for these as well as your colonoscopy, which you need for screening.     We also discussed the importance of > 150 minutes of moderate activity exercise to further optimize your physical condition, as well as the importance of a mediterranean diet.     We will see you back in 3 months.     Kind regards,   [facility]

## 2025-05-28 ENCOUNTER — TELEPHONE (OUTPATIENT)
Dept: PRIMARY CARE | Facility: HOSPITAL | Age: 55
End: 2025-05-28

## 2025-05-28 LAB
EST. AVERAGE GLUCOSE BLD GHB EST-MCNC: 131 MG/DL
EST. AVERAGE GLUCOSE BLD GHB EST-SCNC: 7.3 MMOL/L
HBA1C MFR BLD: 6.2 %

## 2025-05-28 NOTE — RESULT ENCOUNTER NOTE
Reviewed results. CMP and CBC are within normal limits. BNP is pending. His A1c of 6.2% will be addressed at his next visit and he was counseled on lifestyle modifications during the office visit.

## 2025-05-29 LAB
ALBUMIN SERPL-MCNC: 4.3 G/DL (ref 3.6–5.1)
ALP SERPL-CCNC: 118 U/L (ref 35–144)
ALT SERPL-CCNC: 16 U/L (ref 9–46)
ANION GAP SERPL CALCULATED.4IONS-SCNC: 11 MMOL/L (CALC) (ref 7–17)
AST SERPL-CCNC: 19 U/L (ref 10–35)
BILIRUB SERPL-MCNC: 0.6 MG/DL (ref 0.2–1.2)
BNP SERPL-MCNC: NORMAL PG/ML
BUN SERPL-MCNC: 10 MG/DL (ref 7–25)
CALCIUM SERPL-MCNC: 9.1 MG/DL (ref 8.6–10.3)
CHLORIDE SERPL-SCNC: 104 MMOL/L (ref 98–110)
CHOLEST SERPL-MCNC: 112 MG/DL
CHOLEST/HDLC SERPL: 2.6 (CALC)
CO2 SERPL-SCNC: 23 MMOL/L (ref 20–32)
CREAT SERPL-MCNC: 0.93 MG/DL (ref 0.7–1.3)
EGFRCR SERPLBLD CKD-EPI 2021: 97 ML/MIN/1.73M2
ERYTHROCYTE [DISTWIDTH] IN BLOOD BY AUTOMATED COUNT: 14.6 % (ref 11–15)
GLUCOSE SERPL-MCNC: 86 MG/DL (ref 65–99)
HCT VFR BLD AUTO: 46.2 % (ref 38.5–50)
HDLC SERPL-MCNC: 43 MG/DL
HGB BLD-MCNC: 15.2 G/DL (ref 13.2–17.1)
LDLC SERPL CALC-MCNC: 51 MG/DL (CALC)
MAGNESIUM SERPL-MCNC: 1.9 MG/DL (ref 1.5–2.5)
MCH RBC QN AUTO: 31.1 PG (ref 27–33)
MCHC RBC AUTO-ENTMCNC: 32.9 G/DL (ref 32–36)
MCV RBC AUTO: 94.5 FL (ref 80–100)
NONHDLC SERPL-MCNC: 69 MG/DL (CALC)
PLATELET # BLD AUTO: 238 THOUSAND/UL (ref 140–400)
PMV BLD REES-ECKER: 11.2 FL (ref 7.5–12.5)
POTASSIUM SERPL-SCNC: 3.9 MMOL/L (ref 3.5–5.3)
PROT SERPL-MCNC: 7.4 G/DL (ref 6.1–8.1)
RBC # BLD AUTO: 4.89 MILLION/UL (ref 4.2–5.8)
SODIUM SERPL-SCNC: 138 MMOL/L (ref 135–146)
TRIGL SERPL-MCNC: 94 MG/DL
WBC # BLD AUTO: 6.9 THOUSAND/UL (ref 3.8–10.8)

## 2025-06-09 ENCOUNTER — HOSPITAL ENCOUNTER (OUTPATIENT)
Dept: CARDIOLOGY | Facility: HOSPITAL | Age: 55
Discharge: HOME | End: 2025-06-09
Payer: MEDICARE

## 2025-06-09 DIAGNOSIS — I10 PRIMARY HYPERTENSION: ICD-10-CM

## 2025-06-09 DIAGNOSIS — I50.9 HEART FAILURE, UNSPECIFIED HF CHRONICITY, UNSPECIFIED HEART FAILURE TYPE: ICD-10-CM

## 2025-06-09 DIAGNOSIS — I42.8 NON-ISCHEMIC CARDIOMYOPATHY (MULTI): ICD-10-CM

## 2025-06-09 DIAGNOSIS — I50.32 HEART FAILURE WITH IMPROVED EJECTION FRACTION (HFIMPEF): ICD-10-CM

## 2025-06-09 DIAGNOSIS — R00.2 PALPITATIONS: ICD-10-CM

## 2025-06-09 LAB
AORTIC VALVE PEAK VELOCITY: 1.33 M/S
AV PEAK GRADIENT: 7 MMHG
AVA (PEAK VEL): 3.54 CM2
EJECTION FRACTION APICAL 4 CHAMBER: 55.1
EJECTION FRACTION: 59 %
LEFT ATRIUM VOLUME AREA LENGTH INDEX BSA: 44.1 ML/M2
LEFT VENTRICLE INTERNAL DIMENSION DIASTOLE: 5.3 CM (ref 3.5–6)
LEFT VENTRICULAR OUTFLOW TRACT DIAMETER: 2.4 CM
MITRAL VALVE E/A RATIO: 1.54
RIGHT VENTRICLE FREE WALL PEAK S': 19.1 CM/S
RIGHT VENTRICLE PEAK SYSTOLIC PRESSURE: 34 MMHG
TRICUSPID ANNULAR PLANE SYSTOLIC EXCURSION: 2.4 CM

## 2025-06-09 PROCEDURE — 2500000004 HC RX 250 GENERAL PHARMACY W/ HCPCS (ALT 636 FOR OP/ED): Performed by: STUDENT IN AN ORGANIZED HEALTH CARE EDUCATION/TRAINING PROGRAM

## 2025-06-09 PROCEDURE — 93242 EXT ECG>48HR<7D RECORDING: CPT

## 2025-06-09 PROCEDURE — C8929 TTE W OR WO FOL WCON,DOPPLER: HCPCS

## 2025-06-09 PROCEDURE — 93306 TTE W/DOPPLER COMPLETE: CPT | Performed by: INTERNAL MEDICINE

## 2025-06-09 RX ADMIN — HUMAN ALBUMIN MICROSPHERES AND PERFLUTREN 1.75 ML: 10; .22 INJECTION, SOLUTION INTRAVENOUS at 10:41

## 2025-06-24 ENCOUNTER — TELEPHONE (OUTPATIENT)
Dept: SCHEDULING | Age: 55
End: 2025-06-24
Payer: MEDICARE

## 2025-07-08 DIAGNOSIS — I26.99 PULMONARY EMBOLISM, UNSPECIFIED CHRONICITY, UNSPECIFIED PULMONARY EMBOLISM TYPE, UNSPECIFIED WHETHER ACUTE COR PULMONALE PRESENT (MULTI): ICD-10-CM

## 2025-07-08 NOTE — PROGRESS NOTES
Received request from [facility] to refill Rivaroxaban at different pharmacy. 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 Called pharmacy after receiving multiple E-prescribing errors. Staff confirmed that this prescription had already been ordered at desired pharmacy. Pharmacy staff initiated refill and would be ready for patient to pickup soon.

## 2025-08-19 ENCOUNTER — APPOINTMENT (OUTPATIENT)
Dept: GASTROENTEROLOGY | Facility: HOSPITAL | Age: 55
End: 2025-08-19
Payer: MEDICARE

## 2025-09-04 DIAGNOSIS — I50.9 HEART FAILURE, UNSPECIFIED HF CHRONICITY, UNSPECIFIED HEART FAILURE TYPE: ICD-10-CM

## 2025-09-04 RX ORDER — FUROSEMIDE 40 MG/1
40 TABLET ORAL DAILY PRN
Qty: 90 TABLET | Refills: 0 | Status: SHIPPED | OUTPATIENT
Start: 2025-09-04 | End: 2026-01-02

## 2026-02-03 ENCOUNTER — APPOINTMENT (OUTPATIENT)
Dept: CARDIOLOGY | Facility: HOSPITAL | Age: 56
End: 2026-02-03
Payer: MEDICARE